# Patient Record
Sex: FEMALE | Race: WHITE | Employment: UNEMPLOYED | URBAN - METROPOLITAN AREA
[De-identification: names, ages, dates, MRNs, and addresses within clinical notes are randomized per-mention and may not be internally consistent; named-entity substitution may affect disease eponyms.]

---
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
## 2021-10-05 ENCOUNTER — OFFICE VISIT (OUTPATIENT)
Dept: INTERNAL MEDICINE CLINIC | Age: 21
End: 2021-10-05
Payer: COMMERCIAL

## 2021-10-05 VITALS
HEART RATE: 112 BPM | WEIGHT: 204.2 LBS | OXYGEN SATURATION: 100 % | SYSTOLIC BLOOD PRESSURE: 123 MMHG | BODY MASS INDEX: 32.05 KG/M2 | DIASTOLIC BLOOD PRESSURE: 74 MMHG | RESPIRATION RATE: 20 BRPM | HEIGHT: 67 IN | TEMPERATURE: 97.6 F

## 2021-10-05 DIAGNOSIS — Z76.89 ENCOUNTER TO ESTABLISH CARE: Primary | ICD-10-CM

## 2021-10-05 DIAGNOSIS — R00.0 TACHYCARDIA: ICD-10-CM

## 2021-10-05 DIAGNOSIS — G47.411 NARCOLEPSY WITH CATAPLEXY: ICD-10-CM

## 2021-10-05 DIAGNOSIS — F41.8 DEPRESSION WITH ANXIETY: ICD-10-CM

## 2021-10-05 PROCEDURE — 99204 OFFICE O/P NEW MOD 45 MIN: CPT | Performed by: NURSE PRACTITIONER

## 2021-10-05 RX ORDER — ESCITALOPRAM OXALATE 10 MG/1
10 TABLET ORAL DAILY
Qty: 90 TABLET | Refills: 1 | Status: SHIPPED | OUTPATIENT
Start: 2021-10-05 | End: 2021-12-14 | Stop reason: ALTCHOICE

## 2021-10-05 NOTE — PROGRESS NOTES
Chief Complaint   Patient presents with   [facility].

## 2021-10-05 NOTE — PROGRESS NOTES
[facility] [address], [address] [facility] Matt Deng  [phone] [facility]/[phone] fax    10/5/2021    HPI:   Danny Harmno 2000 is a pleasant WHITE/NON- female who presents today to establish care and for routine physical exam.  She was diagnosed with narcolepsy with cataplexy November 2020 and is being followed by , neuro. She is taking Xywav for treatment and tolerating well. She suffers from extreme anxiety that is crippling at times. She experiences elevated heart rate, palpitations, shortness of breath, and chest pain at times when her anxiety is elevated. She sees a counselor at [facility]. She was on Lexapro until it was discontinued in November 2020 when she was diagnosed with narcolepsy. She is unsure if Lexapro was effective due to her narcolepsy diagnosis. She is seeking to restart this therapy. She believes she suffering with situational depression as well. Her mother is currently taking Zoloft. Past Medical History:   Diagnosis Date    Acid reflux     Anxiety     Depression     Narcolepsy     Spina bifida occulta      History reviewed. No pertinent surgical history. Current Outpatient Medications   Medication Sig    escitalopram oxalate (LEXAPRO) 10 mg tablet Take 1 Tablet by mouth daily.  sodium,calcium,mag,pot oxybate (Xywav) 0.5 gram/mL soln Take  by mouth. No current facility-administered medications for this visit. Allergies and Intolerances:    Allergies   Allergen Reactions    Nuvigil [Armodafinil] Unknown (comments)     Involuntary muscle movements    Sunosi [Solriamfetol] Unable to Obtain     hyperactive     Family History:   Family History   Problem Relation Age of Onset    Attention Deficit Disorder Mother     Arthritis-osteo Mother     Migraines Father     Attention Deficit Disorder Sister     Breast Cancer Maternal Aunt     Depression Maternal Grandmother     Lung Disease Maternal Grandmother     High Cholesterol Maternal Grandmother     Heart Attack Maternal Grandmother     Asthma Maternal Grandmother     Eczema Maternal Grandmother     Lung Disease Maternal Grandfather     High Cholesterol Maternal Grandfather     Heart Attack Maternal Grandfather      Social History:   She  reports that she has never smoked. She has never used smokeless tobacco.   Social History     Substance and Sexual Activity   Alcohol Use Never     Immunization History:  Immunization History   Administered Date(s) Administered    COVID-19, PFIZER, MRNA, LNP-S, PF, 30MCG/0.3ML DOSE 04/25/2021, 05/16/2021    DTaP 2000, 01/28/2002, 03/27/2002, 05/14/2003, 09/29/2005    Hep A Vaccine 2 Dose Schedule (Ped/Adol) 12/28/2018    Hep B Vaccine [birth year] 07/05/2002    Hib (PRP-OMP) 2000, 01/28/2002, 03/27/2002, 10/30/2002    IPV 2000, 01/28/2002, 03/27/2002, 09/29/2005    Meningococcal (MCV4P) Vaccine 02/28/2014, 12/28/2018    Pneumococcal Conjugate (PCV-7) 2000, 01/28/2002, 03/27/2002, 10/30/2002    Td, Adsorbed PF 12/05/2011    Tdap 12/05/2011    Varicella Virus Vaccine 05/14/2003, 10/10/2008       Review of Systems:   As above included in HPI. Otherwise 11 point review of systems negative including constitutional, skin, HENT, eyes, respiratory, cardiovascular, gastrointestinal, genitourinary, musculoskeletal, endocrine, hematologic, allergy, and neurologic. Physical:   Visit Vitals  /74   Pulse (!) 112   Temp 97.6 °F (36.4 °C) (Temporal)   Resp 20   Ht 5' 7\" (1.702 m)   Wt 204 lb 3.2 oz (92.6 kg)   SpO2 100%   BMI 31.98 kg/m²      Wt Readings from Last 3 Encounters:   10/05/21 204 lb 3.2 oz (92.6 kg)         Exam:   Physical Exam  Vitals and nursing note reviewed. Constitutional:       Appearance: Normal appearance. She is obese. HENT:      Head: Normocephalic and atraumatic.       Right Ear: Tympanic membrane, ear canal and external ear normal. Left Ear: Tympanic membrane, ear canal and external ear normal.   Eyes:      Extraocular Movements: Extraocular movements intact. Conjunctiva/sclera: Conjunctivae normal.   Neck:      Vascular: No carotid bruit. Cardiovascular:      Rate and Rhythm: Normal rate and regular rhythm. Pulses: Normal pulses. Heart sounds: Normal heart sounds. Comments: No edema noted  Pulmonary:      Effort: Pulmonary effort is normal. No respiratory distress. Breath sounds: Normal breath sounds. No wheezing. Abdominal:      General: Abdomen is flat. Bowel sounds are normal. There is no distension. Palpations: Abdomen is soft. Tenderness: There is no abdominal tenderness. Musculoskeletal:         General: Normal range of motion. Cervical back: Normal range of motion and neck supple. Comments: Gait stable   Skin:     General: Skin is warm and dry. Neurological:      General: No focal deficit present. Mental Status: She is alert and oriented to person, place, and time. Psychiatric:         Mood and Affect: Mood normal.         Behavior: Behavior normal.         Thought Content: Thought content normal.         Judgment: Judgment normal.         Review of Data:  Labs reviewed: N/A      Plan:    ICD-10-CM ICD-9-CM    1. Encounter to establish care  Z76.89 V65.8    2. Depression with anxiety  F41.8 300.4 escitalopram oxalate (LEXAPRO) 10 mg tablet   3. Narcolepsy with cataplexy  G47.411 347.01    4. Tachycardia  R00.0 785.0      Encounter establish care  Patient's previous PCP was her pediatrician. Depression with anxiety  Initiate Lexapro 10 mg daily  After 2 weeks patient may increase Lexapro to 2 tablets (20 mg) daily and notify office if she chooses to do this.   Continue counseling    Narcolepsy with cataplexy  Continue to follow-up with neurologist Dr. Wilbert Guaman current therapy Levonia Perfect as prescribed by neuro    Tachycardia  Patient monitors her heart rate on her Elfego. Normally her heart rate is in the 80s and 90s but when her anxiety is up her heart rate can go as high as 150. Discussed obtaining EKG due to her intermittent palpitations and chest pain. She has requested to hold off on obtaining EKG for 6 months to see if Lexapro will be effective controlling her anxiety.     Follow up 6 months  Labs needed 1 week prior to appt: No    Dr. Sherice Castillo, AGNP-C, DNP  [facility] [address] [place] [address]

## 2021-11-01 ENCOUNTER — TELEPHONE (OUTPATIENT)
Dept: INTERNAL MEDICINE CLINIC | Age: 21
End: 2021-11-01

## 2021-11-01 NOTE — TELEPHONE ENCOUNTER
Pt request appointment for lump left side stomach as soon as possible. Stated it was noticed during a disability physical this past Saturday. Said she was previously unaware of it but after the doctor felt it she is now very aware and it is tender.        Sending to provider on call

## 2021-11-01 NOTE — TELEPHONE ENCOUNTER
Please schedule patient with DR. Ling as she has appointments available or wait until [facility] returns next week.

## 2021-11-02 ENCOUNTER — HOSPITAL ENCOUNTER (OUTPATIENT)
Dept: GENERAL RADIOLOGY | Age: 21
Discharge: HOME | End: 2021-11-02
Payer: COMMERCIAL

## 2021-11-02 ENCOUNTER — OFFICE VISIT (OUTPATIENT)
Dept: INTERNAL MEDICINE CLINIC | Age: 21
End: 2021-11-02
Payer: COMMERCIAL

## 2021-11-02 VITALS
OXYGEN SATURATION: 100 % | DIASTOLIC BLOOD PRESSURE: 73 MMHG | WEIGHT: 204.6 LBS | SYSTOLIC BLOOD PRESSURE: 111 MMHG | HEIGHT: 67 IN | RESPIRATION RATE: 20 BRPM | HEART RATE: 118 BPM | TEMPERATURE: 97.9 F | BODY MASS INDEX: 32.11 KG/M2

## 2021-11-02 DIAGNOSIS — R10.12 LEFT UPPER QUADRANT ABDOMINAL PAIN: Primary | ICD-10-CM

## 2021-11-02 DIAGNOSIS — R10.12 LEFT UPPER QUADRANT ABDOMINAL PAIN: ICD-10-CM

## 2021-11-02 LAB
BILIRUB UR QL STRIP: NEGATIVE
GLUCOSE UR-MCNC: NEGATIVE MG/DL
KETONES P FAST UR STRIP-MCNC: NEGATIVE MG/DL
PH UR STRIP: 7 [PH] (ref 4.6–8)
PROT UR QL STRIP: NEGATIVE
SP GR UR STRIP: 1.02 (ref 1–1.03)
UA UROBILINOGEN AMB POC: NORMAL (ref 0.2–1)
URINALYSIS CLARITY POC: CLEAR
URINALYSIS COLOR POC: YELLOW
URINE BLOOD POC: NEGATIVE
URINE LEUKOCYTES POC: NEGATIVE
URINE NITRITES POC: NEGATIVE

## 2021-11-02 PROCEDURE — 71100 X-RAY EXAM RIBS UNI 2 VIEWS: CPT

## 2021-11-02 PROCEDURE — 81003 URINALYSIS AUTO W/O SCOPE: CPT | Performed by: INTERNAL MEDICINE

## 2021-11-02 PROCEDURE — 99213 OFFICE O/P EST LOW 20 MIN: CPT | Performed by: INTERNAL MEDICINE

## 2021-11-02 NOTE — PROGRESS NOTES
Jose A Robertson female 22 y.o.       1. Have you been to the ER, urgent care clinic since your last visit? Hospitalized since your last visit? No    2. Have you seen or consulted any other health care providers outside of the [address] [place] [address] since your last visit? Include any pap smears or colon screening.  Yes When: Neurologist 10/21

## 2021-11-02 NOTE — PROGRESS NOTES
GAYLE Delatorre Comment is here for left upper quadrant abdominal pain, worse with certain movements. She says this began after a physician doing disability evaluations noted he found a \"lump\" in the left side of her abdomen. It was not bothering her before she was examined. She has not tried anything topical or oral for the pain. She denies any known trauma to the area. Past Medical History:   Diagnosis Date    Acid reflux     Anxiety     Depression     Narcolepsy     Spina bifida occulta         No past surgical history on file. Current Outpatient Medications   Medication Sig Dispense Refill    escitalopram oxalate (LEXAPRO) 10 mg tablet Take 1 Tablet by mouth daily. 90 Tablet 1    sodium,calcium,mag,pot oxybate (Xywav) 0.5 gram/mL soln Take  by mouth. Allergies   Allergen Reactions    Nuvigil [Armodafinil] Unknown (comments)     Involuntary muscle movements    Sunosi [Solriamfetol] Unable to Obtain     hyperactive        Social History     Socioeconomic History    Marital status: SINGLE     Spouse name: Not on file    Number of children: Not on file    Years of education: Not on file    Highest education level: Not on file   Occupational History    Not on file   Tobacco Use    Smoking status: Never Smoker    Smokeless tobacco: Never Used   Substance and Sexual Activity    Alcohol use: Never    Drug use: Never    Sexual activity: Not on file   Other Topics Concern    Not on file   Social History Narrative    Not on file     Social Determinants of Health     Financial Resource Strain:     Difficulty of Paying Living Expenses:    Food Insecurity:     Worried About Running Out of Food in the Last Year:     [address] in the Last Year:    Transportation Needs:     Lack of Transportation (Medical):      Lack of Transportation (Non-Medical):    Physical Activity:     Days of Exercise per Week:     Minutes of Exercise per Session:    Stress:     Feeling of Stress : Social Connections:     Frequency of Communication with Friends and Family:     Frequency of Social Gatherings with Friends and Family:     Attends Quaker Services:     Active Member of Clubs or Organizations:     Attends Club or Organization Meetings:     Marital Status:    Intimate Partner Violence:     Fear of Current or Ex-Partner:     Emotionally Abused:     Physically Abused:     Sexually Abused:         Family History   Problem Relation Age of Onset    Attention Deficit Disorder Mother     Arthritis-osteo Mother     Migraines Father     Attention Deficit Disorder Sister     Breast Cancer Maternal Aunt     Depression Maternal Grandmother     Lung Disease Maternal Grandmother     High Cholesterol Maternal Grandmother     Heart Attack Maternal Grandmother     Asthma Maternal Grandmother     Eczema Maternal Grandmother     Lung Disease Maternal Grandfather     High Cholesterol Maternal Grandfather     Heart Attack Maternal Grandfather            Visit Vitals  /73 (BP 1 Location: Left arm, BP Patient Position: Sitting, BP Cuff Size: Adult)   Pulse (!) 118   Temp 97.9 °F (36.6 °C) (Temporal)   Resp 20   Ht 5' 7\" (1.702 m)   Wt 204 lb 9.6 oz (92.8 kg)   LMP 10/08/2021   SpO2 100%   BMI 32.04 kg/m²        Review of Systems   Constitutional: Negative for chills and fever. Gastrointestinal: Negative for blood in stool. Genitourinary: Negative for dysuria, hematuria and urgency. Skin: Negative for rash. Psychiatric/Behavioral: The patient is not nervous/anxious. Physical Exam  Constitutional:       Appearance: She is not ill-appearing. Eyes:      General: No scleral icterus. Conjunctiva/sclera: Conjunctivae normal.   Neck:      Comments: Lymph: No cervical or supraclavicular lymphadenopathy. Cardiovascular:      Rate and Rhythm: Normal rate and regular rhythm. Pulses: Normal pulses. Heart sounds: No friction rub. No gallop.     Pulmonary:      Effort: Pulmonary effort is normal.      Breath sounds: Normal breath sounds. Abdominal:      General: Abdomen is flat. Palpations: Abdomen is soft. There is no mass. Comments: There is palpation over lower left ribs and mid axillary line, no bony instability. Musculoskeletal:      Cervical back: Neck supple. Comments: No clubbing, cyanosis, or edema. Calves nontender. Skin:     General: Skin is warm and dry. Findings: No rash. Neurological:      Mental Status: She is alert and oriented to person, place, and time. Psychiatric:         Mood and Affect: Mood normal.                  Diagnoses and all orders for this visit:    1. Left upper quadrant abdominal pain  Comments:  Possibly musculoskeletal.  Aleve 2 twice daily with food for the next few days.   Has order for left rib x-rays, further evaluation if pain persists  Orders:  -     AMB POC URINALYSIS DIP STICK AUTO W/O MICRO  -     XR RIBS LT UNI 2 V; Future                  Nick Marshall MD

## 2021-11-04 ENCOUNTER — TELEPHONE (OUTPATIENT)
Dept: INTERNAL MEDICINE CLINIC | Age: 21
End: 2021-11-04

## 2021-11-04 DIAGNOSIS — R10.12 LEFT UPPER QUADRANT ABDOMINAL PAIN: Primary | ICD-10-CM

## 2021-11-04 NOTE — TELEPHONE ENCOUNTER
Let her know I put in order for an ultrasound of her spleen, [facility] scheduling should be calling her to schedule this.

## 2021-11-04 NOTE — TELEPHONE ENCOUNTER
Patient reached and made aware of results, pt states she is still having some left side upper quadrant pain, states its still an achy pain 5/10.

## 2021-11-05 NOTE — TELEPHONE ENCOUNTER
Patient reached and made aware of US order per Dr. Nat Martinez. Patient states she believes it is her Amplified musculoskeletal pain syndrome (AMPS) which is causing her this pain and she rather not get ultrasound.

## 2021-11-11 DIAGNOSIS — R10.12 LEFT UPPER QUADRANT ABDOMINAL PAIN: ICD-10-CM

## 2021-11-23 ENCOUNTER — TELEPHONE (OUTPATIENT)
Dept: INTERNAL MEDICINE CLINIC | Age: 21
End: 2021-11-23

## 2021-11-23 NOTE — TELEPHONE ENCOUNTER
Reached patient and she stated she is wanting to see pain mgmt in regards to the narcolepsy with cataplexy that was discussed at her 10/5/21 appt. Patient states she wants to rule out whether or not is it AMPS and discuss treatment. Please place referral and I will call pt back once faxed.

## 2021-11-23 NOTE — TELEPHONE ENCOUNTER
Pt calling asking for referral to see Dr. Peri Randolph for pain management.  Their number is [phone]

## 2021-11-24 NOTE — TELEPHONE ENCOUNTER
Pain management is not utilized for diagnostic purposes. Pain management is just that pain management. The prescribed pain medication and other modalities to relieve 1 of pain. She needs to follow-up with her neurologist if she is seeking a possible diagnosis of AMPS.   Thank you

## 2021-11-24 NOTE — TELEPHONE ENCOUNTER
I am sorry I am unable to provide this referral as it is not and appropriate referral.  She has not been diagnosed with AMPS nor has she stated that she was having pain at her appointment. She needs to request a referral from her neurologist if they feel this is necessary.   Thank you

## 2021-11-24 NOTE — TELEPHONE ENCOUNTER
Patient reached and she states she just wants therapy for the pain. She states she is not trying to rule out AMPS she just wants to see pain management for the issue. Patient states her cousin deals with this issue and sees pain management.

## 2021-11-26 NOTE — TELEPHONE ENCOUNTER
Patient verbalized understanding and states she needs to see pain management. Offered pt VV to discuss issue with Dr. Mercedes New. Patient states she will find someone else that will \"listen\" to her.

## 2021-11-29 NOTE — TELEPHONE ENCOUNTER
Patient states she will wait until next available appt, which is 12/14 at 3:15. Patient wants to know if she can get routine blood work done at this visit as well because she hasn't had labs done in a while. Please advise.

## 2021-11-29 NOTE — TELEPHONE ENCOUNTER
Pt calling again re: referral. She was advised of the notes in this telephone encounter and she said they are not accurate. She now understands why you would say the referral needs to come from neurology. Stated it is not related to AMPS. Narcolepsy or cataplexy. Pari Ayo it is for extreme pain in hand and different other pain. After taking with her Aunt who has similar symptoms she would like to be referred to Rheumatology, not pain management. Does she need and appt to discuss this with you?

## 2021-12-14 ENCOUNTER — OFFICE VISIT (OUTPATIENT)
Dept: INTERNAL MEDICINE CLINIC | Age: 21
End: 2021-12-14
Payer: COMMERCIAL

## 2021-12-14 ENCOUNTER — HOSPITAL ENCOUNTER (OUTPATIENT)
Dept: LAB | Age: 21
Discharge: HOME | End: 2021-12-14

## 2021-12-14 VITALS
OXYGEN SATURATION: 100 % | DIASTOLIC BLOOD PRESSURE: 73 MMHG | WEIGHT: 207.6 LBS | RESPIRATION RATE: 20 BRPM | HEART RATE: 129 BPM | BODY MASS INDEX: 32.58 KG/M2 | TEMPERATURE: 97.7 F | HEIGHT: 67 IN | SYSTOLIC BLOOD PRESSURE: 123 MMHG

## 2021-12-14 DIAGNOSIS — G47.411 NARCOLEPSY WITH CATAPLEXY: ICD-10-CM

## 2021-12-14 DIAGNOSIS — R53.83 FATIGUE, UNSPECIFIED TYPE: ICD-10-CM

## 2021-12-14 DIAGNOSIS — E55.9 VITAMIN D DEFICIENCY: ICD-10-CM

## 2021-12-14 DIAGNOSIS — R20.2 NUMBNESS AND TINGLING IN BOTH HANDS: ICD-10-CM

## 2021-12-14 DIAGNOSIS — R52 GENERALIZED PAIN: ICD-10-CM

## 2021-12-14 DIAGNOSIS — R10.84 GENERALIZED ABDOMINAL PAIN: ICD-10-CM

## 2021-12-14 DIAGNOSIS — G98.8 SENSORY HYPERSENSITIVITY: ICD-10-CM

## 2021-12-14 DIAGNOSIS — E53.8 VITAMIN B12 DEFICIENCY: ICD-10-CM

## 2021-12-14 DIAGNOSIS — R20.0 NUMBNESS AND TINGLING IN BOTH HANDS: ICD-10-CM

## 2021-12-14 DIAGNOSIS — K21.9 GASTROESOPHAGEAL REFLUX DISEASE WITHOUT ESOPHAGITIS: Primary | ICD-10-CM

## 2021-12-14 DIAGNOSIS — F41.8 DEPRESSION WITH ANXIETY: ICD-10-CM

## 2021-12-14 DIAGNOSIS — Z87.820 HISTORY OF BRAIN CONCUSSION: ICD-10-CM

## 2021-12-14 LAB — XX-LABCORP SPECIMEN COL,LCBCF: NORMAL

## 2021-12-14 PROCEDURE — 99214 OFFICE O/P EST MOD 30 MIN: CPT | Performed by: NURSE PRACTITIONER

## 2021-12-14 PROCEDURE — 99001 SPECIMEN HANDLING PT-LAB: CPT

## 2021-12-14 RX ORDER — CITALOPRAM 20 MG/1
20 TABLET, FILM COATED ORAL DAILY
Qty: 90 TABLET | Refills: 0 | Status: SHIPPED | OUTPATIENT
Start: 2021-12-14 | End: 2022-03-06 | Stop reason: DRUGHIGH

## 2021-12-14 RX ORDER — OMEPRAZOLE 20 MG/1
20 CAPSULE, DELAYED RELEASE ORAL
Qty: 90 CAPSULE | Refills: 1 | Status: SHIPPED | OUTPATIENT
Start: 2021-12-14 | End: 2022-03-22

## 2021-12-14 NOTE — PROGRESS NOTES
Chief Complaint   Patient presents with    Hand Pain     bilateral hand pain    Pain (Chronic)         1. \"Have you been to the ER, urgent care clinic since your last visit? Hospitalized since your last visit? \" No    2. \"Have you seen or consulted any other health care providers outside of the [address] [place] [address] since your last visit? \" No     3. For patients aged 39-70: Has the patient had a colonoscopy / FIT/ Cologuard? NA based on age or sex     If the patient is female:    3. For patients aged 41-77: Has the patient had a mammogram within the past 2 years? NA based on age or sex    11. For patients aged 21-65: Has the patient had a pap smear?  NA based on age or sex

## 2021-12-17 LAB
25(OH)D3+25(OH)D2 SERPL-MCNC: 16.2 NG/ML (ref 30–100)
ALBUMIN SERPL-MCNC: 5.3 G/DL (ref 3.9–5)
ALBUMIN/GLOB SERPL: 1.6 {RATIO} (ref 1.2–2.2)
ALDOLASE SERPL-CCNC: 5.2 U/L (ref 3.3–10.3)
ALP SERPL-CCNC: 79 IU/L (ref 42–106)
ALT SERPL-CCNC: 11 IU/L (ref 0–32)
AST SERPL-CCNC: 16 IU/L (ref 0–40)
BASOPHILS # BLD AUTO: 0.1 X10E3/UL (ref 0–0.2)
BASOPHILS NFR BLD AUTO: 1 %
BILIRUB SERPL-MCNC: 0.3 MG/DL (ref 0–1.2)
BUN SERPL-MCNC: 9 MG/DL (ref 6–20)
BUN/CREAT SERPL: 12 (ref 9–23)
CALCIUM SERPL-MCNC: 10.4 MG/DL (ref 8.7–10.2)
CCP IGA+IGG SERPL IA-ACNC: 6 UNITS (ref 0–19)
CENTROMERE B AB SER-ACNC: <0.2 AI (ref 0–0.9)
CHLORIDE SERPL-SCNC: 103 MMOL/L (ref 96–106)
CHROMATIN AB SERPL-ACNC: <0.2 AI (ref 0–0.9)
CK SERPL-CCNC: 64 U/L (ref 32–182)
CO2 SERPL-SCNC: 19 MMOL/L (ref 20–29)
CREAT SERPL-MCNC: 0.75 MG/DL (ref 0.57–1)
CRP SERPL-MCNC: 2 MG/L (ref 0–10)
DSDNA AB SER-ACNC: <1 IU/ML (ref 0–9)
ENA JO1 AB SER-ACNC: <0.2 AI (ref 0–0.9)
ENA RNP AB SER-ACNC: 0.6 AI (ref 0–0.9)
ENA SCL70 AB SER-ACNC: <0.2 AI (ref 0–0.9)
ENA SM AB SER-ACNC: <0.2 AI (ref 0–0.9)
ENA SM+RNP AB SER-ACNC: <0.2 AI (ref 0–0.9)
ENA SS-A AB SER-ACNC: <0.2 AI (ref 0–0.9)
ENA SS-B AB SER-ACNC: <0.2 AI (ref 0–0.9)
EOSINOPHIL # BLD AUTO: 0.1 X10E3/UL (ref 0–0.4)
EOSINOPHIL NFR BLD AUTO: 1 %
ERYTHROCYTE [DISTWIDTH] IN BLOOD BY AUTOMATED COUNT: 12.6 % (ref 11.7–15.4)
ERYTHROCYTE [SEDIMENTATION RATE] IN BLOOD BY WESTERGREN METHOD: 30 MM/HR (ref 0–32)
FOLATE SERPL-MCNC: 15.1 NG/ML
GLOBULIN SER CALC-MCNC: 3.3 G/DL (ref 1.5–4.5)
GLUCOSE SERPL-MCNC: 67 MG/DL (ref 65–99)
HCT VFR BLD AUTO: 43.8 % (ref 34–46.6)
HGB BLD-MCNC: 14.1 G/DL (ref 11.1–15.9)
IMM GRANULOCYTES # BLD AUTO: 0 X10E3/UL (ref 0–0.1)
IMM GRANULOCYTES NFR BLD AUTO: 0 %
LDH SERPL-CCNC: 204 IU/L (ref 119–226)
LYMPHOCYTES # BLD AUTO: 3.1 X10E3/UL (ref 0.7–3.1)
LYMPHOCYTES NFR BLD AUTO: 35 %
Lab: ABNORMAL
MCH RBC QN AUTO: 27 PG (ref 26.6–33)
MCHC RBC AUTO-ENTMCNC: 32.2 G/DL (ref 31.5–35.7)
MCV RBC AUTO: 84 FL (ref 79–97)
METHYLMALONATE SERPL-SCNC: 463 NMOL/L (ref 0–378)
MITOCHONDRIA M2 IGG SER-ACNC: <20 UNITS (ref 0–20)
MONOCYTES # BLD AUTO: 0.6 X10E3/UL (ref 0.1–0.9)
MONOCYTES NFR BLD AUTO: 7 %
NEUTROPHILS # BLD AUTO: 5 X10E3/UL (ref 1.4–7)
NEUTROPHILS NFR BLD AUTO: 56 %
PLATELET # BLD AUTO: 287 X10E3/UL (ref 150–450)
POTASSIUM SERPL-SCNC: 4.3 MMOL/L (ref 3.5–5.2)
PROT SERPL-MCNC: 8.6 G/DL (ref 6–8.5)
RBC # BLD AUTO: 5.22 X10E6/UL (ref 3.77–5.28)
RHEUMATOID FACT SERPL-ACNC: 10.1 IU/ML (ref 0–15)
RIBOSOMAL P AB SER-ACNC: <0.2 AI (ref 0–0.9)
SEE BELOW:, 164879: NORMAL
SODIUM SERPL-SCNC: 142 MMOL/L (ref 134–144)
SPECIMEN STATUS REPORT, ROLRST: NORMAL
TSH SERPL DL<=0.005 MIU/L-ACNC: 1.4 UIU/ML (ref 0.45–4.5)
URATE SERPL-MCNC: 5.1 MG/DL (ref 2.6–6.2)
VIT B12 SERPL-MCNC: 517 PG/ML (ref 232–1245)
WBC # BLD AUTO: 8.9 X10E3/UL (ref 3.4–10.8)

## 2021-12-20 ENCOUNTER — TELEPHONE (OUTPATIENT)
Dept: INTERNAL MEDICINE CLINIC | Age: 21
End: 2021-12-20

## 2021-12-20 PROBLEM — E55.9 VITAMIN D DEFICIENCY: Status: ACTIVE | Noted: 2021-12-20

## 2021-12-20 PROBLEM — E53.8 VITAMIN B12 DEFICIENCY: Status: ACTIVE | Noted: 2021-12-20

## 2021-12-20 RX ORDER — CYANOCOBALAMIN 1000 UG/ML
INJECTION, SOLUTION INTRAMUSCULAR; SUBCUTANEOUS
Qty: 10 ML | Refills: 0 | Status: SHIPPED | OUTPATIENT
Start: 2021-12-20 | End: 2022-03-22

## 2021-12-20 RX ORDER — ERGOCALCIFEROL 1.25 MG/1
50000 CAPSULE ORAL
Qty: 12 CAPSULE | Refills: 3 | Status: SHIPPED | OUTPATIENT
Start: 2021-12-20
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
## 2021-12-20 NOTE — TELEPHONE ENCOUNTER
----- Message from Tom Johnson DNP sent at 12/20/2021  8:15 AM EST -----  Vit D and Vit B12 def. These are  most likely the cause for her tingling in her hands, weakness, fatigue, and achiness. I have escribed Vit D 29855 to her pharm. She is to take 1 cap every 7 days. I have also escribed vit B12 injections. Pt needs to go to the pharmacy to obtain this medication and schedule a nurse visit to obtain these injections 3w for 2w then weekly for 4w. Once this is completed, pt will take OTC Vit B12 daily. Remind her to bring the Vit B 12 serum with her.  Thank you

## 2021-12-20 NOTE — PROGRESS NOTES
Vit D and Vit B12 def. These are  most likely the cause for her tingling in her hands, weakness, fatigue, and achiness. I have escribed Vit D 81803 to her pharm. She is to take 1 cap every 7 days. I have also escribed vit B12 injections. Pt needs to go to the pharmacy to obtain this medication and schedule a nurse visit to obtain these injections 3w for 2w then weekly for 4w. Once this is completed, pt will take OTC Vit B12 daily. Remind her to bring the Vit B 12 serum with her.  Thank you

## 2021-12-21 NOTE — TELEPHONE ENCOUNTER
Pt aware of message below and verbalized understanding. Patient will call back to schedule the nurse visit for the B12 once she has obtained the RX from the pharmacy. No further questions or concerns from pt at this time.

## 2021-12-21 NOTE — TELEPHONE ENCOUNTER
Clarified with  since patient is symptomatic patient will need to start B12 injections with the following schedule.  3 injections per week for 2 weeks then weekly injections for 4 weeks

## 2021-12-22 ENCOUNTER — TELEPHONE (OUTPATIENT)
Dept: INTERNAL MEDICINE CLINIC | Age: 21
End: 2021-12-22

## 2021-12-22 NOTE — TELEPHONE ENCOUNTER
Patient states pharmacy indicates they can fill Rx because the  amount in unclear.   Please call patient

## 2022-01-04 ENCOUNTER — TELEPHONE (OUTPATIENT)
Dept: INTERNAL MEDICINE CLINIC | Age: 22
End: 2022-01-04

## 2022-01-05 ENCOUNTER — CLINICAL SUPPORT (OUTPATIENT)
Dept: INTERNAL MEDICINE CLINIC | Age: 22
End: 2022-01-05
Payer: COMMERCIAL

## 2022-01-05 DIAGNOSIS — E53.8 VITAMIN B12 DEFICIENCY: Primary | ICD-10-CM

## 2022-01-05 PROCEDURE — 96372 THER/PROPH/DIAG INJ SC/IM: CPT | Performed by: NURSE PRACTITIONER

## 2022-01-05 RX ORDER — CYANOCOBALAMIN 1000 UG/ML
1000 INJECTION, SOLUTION INTRAMUSCULAR; SUBCUTANEOUS
Status: DISCONTINUED | OUTPATIENT
Start: 2022-01-05 | End: 2022-03-22

## 2022-01-05 RX ADMIN — CYANOCOBALAMIN 1000 MCG: 1000 INJECTION, SOLUTION INTRAMUSCULAR; SUBCUTANEOUS at 14:16

## 2022-01-13 ENCOUNTER — CLINICAL SUPPORT (OUTPATIENT)
Dept: INTERNAL MEDICINE CLINIC | Age: 22
End: 2022-01-13
Payer: COMMERCIAL

## 2022-01-13 VITALS
WEIGHT: 207 LBS | HEART RATE: 99 BPM | HEIGHT: 67 IN | BODY MASS INDEX: 32.49 KG/M2 | RESPIRATION RATE: 16 BRPM | TEMPERATURE: 97.1 F | DIASTOLIC BLOOD PRESSURE: 85 MMHG | SYSTOLIC BLOOD PRESSURE: 129 MMHG

## 2022-01-13 DIAGNOSIS — E53.8 VITAMIN B 12 DEFICIENCY: Primary | ICD-10-CM

## 2022-01-13 PROCEDURE — 96372 THER/PROPH/DIAG INJ SC/IM: CPT | Performed by: NURSE PRACTITIONER

## 2022-01-13 RX ADMIN — CYANOCOBALAMIN 1000 MCG: 1000 INJECTION, SOLUTION INTRAMUSCULAR; SUBCUTANEOUS at 14:45

## 2022-01-13 NOTE — PROGRESS NOTES
Verbal order read back per Dr. Schuyler Arellano Vitamin b12 injection. Patient received vitamin b 12 injection in right deltoid. Patient was observed and no signs or symptoms of an allergic reaction noted at this time. Patient tolerated well and left without complaints. Patient supplied medication.

## 2022-01-27 ENCOUNTER — ANESTHESIA EVENT (OUTPATIENT)
Dept: ENDOSCOPY | Age: 22
End: 2022-01-27
Payer: COMMERCIAL

## 2022-01-27 ENCOUNTER — CLINICAL SUPPORT (OUTPATIENT)
Dept: INTERNAL MEDICINE CLINIC | Age: 22
End: 2022-01-27
Payer: COMMERCIAL

## 2022-01-27 DIAGNOSIS — E53.8 B12 DEFICIENCY: Primary | ICD-10-CM

## 2022-01-27 PROCEDURE — 96372 THER/PROPH/DIAG INJ SC/IM: CPT | Performed by: NURSE PRACTITIONER

## 2022-01-27 RX ADMIN — CYANOCOBALAMIN 1000 MCG: 1000 INJECTION, SOLUTION INTRAMUSCULAR; SUBCUTANEOUS at 15:18

## 2022-01-27 NOTE — PROGRESS NOTES
Pedro Amaro presents with   Chief Complaint   Patient presents with    Injection B12      Patient given B12 injection IM in R deltoid, patient tolerated this procedure without incident.

## 2022-01-28 ENCOUNTER — ANESTHESIA (OUTPATIENT)
Dept: ENDOSCOPY | Age: 22
End: 2022-01-28
Payer: COMMERCIAL

## 2022-01-28 ENCOUNTER — HOSPITAL ENCOUNTER (OUTPATIENT)
Age: 22
Setting detail: OUTPATIENT SURGERY
Discharge: HOME | End: 2022-01-28
Attending: INTERNAL MEDICINE | Admitting: INTERNAL MEDICINE
Payer: COMMERCIAL

## 2022-01-28 VITALS
OXYGEN SATURATION: 100 % | HEART RATE: 63 BPM | TEMPERATURE: 98.9 F | HEIGHT: 67 IN | DIASTOLIC BLOOD PRESSURE: 60 MMHG | BODY MASS INDEX: 32.18 KG/M2 | RESPIRATION RATE: 19 BRPM | WEIGHT: 205 LBS | SYSTOLIC BLOOD PRESSURE: 101 MMHG

## 2022-01-28 LAB — HCG UR QL: NEGATIVE

## 2022-01-28 PROCEDURE — 74011250636 HC RX REV CODE- 250/636: Performed by: NURSE ANESTHETIST, CERTIFIED REGISTERED

## 2022-01-28 PROCEDURE — 2709999900 HC NON-CHARGEABLE SUPPLY: Performed by: INTERNAL MEDICINE

## 2022-01-28 PROCEDURE — 77030008565 HC TBNG SUC IRR ERBE -B: Performed by: INTERNAL MEDICINE

## 2022-01-28 PROCEDURE — 76040000019: Performed by: INTERNAL MEDICINE

## 2022-01-28 PROCEDURE — 00731 ANES UPR GI NDSC PX NOS: CPT | Performed by: NURSE ANESTHETIST, CERTIFIED REGISTERED

## 2022-01-28 PROCEDURE — 88305 TISSUE EXAM BY PATHOLOGIST: CPT

## 2022-01-28 PROCEDURE — 81025 URINE PREGNANCY TEST: CPT

## 2022-01-28 PROCEDURE — 74011000250 HC RX REV CODE- 250: Performed by: NURSE ANESTHETIST, CERTIFIED REGISTERED

## 2022-01-28 PROCEDURE — 76060000031 HC ANESTHESIA FIRST 0.5 HR: Performed by: INTERNAL MEDICINE

## 2022-01-28 PROCEDURE — 00731 ANES UPR GI NDSC PX NOS: CPT | Performed by: ANESTHESIOLOGY

## 2022-01-28 PROCEDURE — 77030019988 HC FCPS ENDOSC DISP BSC -B: Performed by: INTERNAL MEDICINE

## 2022-01-28 RX ORDER — SODIUM CHLORIDE, SODIUM LACTATE, POTASSIUM CHLORIDE, CALCIUM CHLORIDE 600; 310; 30; 20 MG/100ML; MG/100ML; MG/100ML; MG/100ML
25 INJECTION, SOLUTION INTRAVENOUS CONTINUOUS
Status: DISCONTINUED | OUTPATIENT
Start: 2022-01-28 | End: 2022-01-28 | Stop reason: HOSPADM

## 2022-01-28 RX ORDER — PROPOFOL 10 MG/ML
INJECTION, EMULSION INTRAVENOUS AS NEEDED
Status: DISCONTINUED | OUTPATIENT
Start: 2022-01-28 | End: 2022-01-28 | Stop reason: HOSPADM

## 2022-01-28 RX ADMIN — PROPOFOL 20 MG: 10 INJECTION, EMULSION INTRAVENOUS at 11:56

## 2022-01-28 RX ADMIN — FAMOTIDINE: 10 INJECTION INTRAVENOUS at 10:27

## 2022-01-28 RX ADMIN — PROPOFOL 50 MG: 10 INJECTION, EMULSION INTRAVENOUS at 11:55

## 2022-01-28 RX ADMIN — PROPOFOL 50 MG: 10 INJECTION, EMULSION INTRAVENOUS at 11:52

## 2022-01-28 RX ADMIN — SODIUM CHLORIDE, SODIUM LACTATE, POTASSIUM CHLORIDE, AND CALCIUM CHLORIDE 25 ML/HR: 600; 310; 30; 20 INJECTION, SOLUTION INTRAVENOUS at 10:27

## 2022-01-28 RX ADMIN — PROPOFOL 50 MG: 10 INJECTION, EMULSION INTRAVENOUS at 11:53

## 2022-01-28 RX ADMIN — PROPOFOL 50 MG: 10 INJECTION, EMULSION INTRAVENOUS at 11:54

## 2022-01-28 RX ADMIN — PROPOFOL 30 MG: 10 INJECTION, EMULSION INTRAVENOUS at 11:57

## 2022-01-28 NOTE — ANESTHESIA PREPROCEDURE EVALUATION
Anesthetic History   No history of anesthetic complications            Review of Systems / Medical History  Patient summary reviewed, nursing notes reviewed and pertinent labs reviewed    Pulmonary  Within defined limits                 Neuro/Psych             Comments: syncope Cardiovascular            Dysrhythmias            GI/Hepatic/Renal     GERD           Endo/Other        Obesity     Other Findings            Physical Exam    Airway  Mallampati: II  TM Distance: 4 - 6 cm  Neck ROM: normal range of motion   Mouth opening: Normal     Cardiovascular    Rhythm: regular           Dental    Dentition: Upper dentition intact and Lower dentition intact     Pulmonary  Breath sounds clear to auscultation               Abdominal  GI exam deferred       Other Findings            Anesthetic Plan    ASA: 2  Anesthesia type: MAC            Anesthetic plan and risks discussed with: Patient

## 2022-01-28 NOTE — DISCHARGE INSTRUCTIONS
.Upper GI Endoscopy: What to Expect at [address] [place] [address]  After you have an endoscopy, you will stay at the hospital or clinic for 1 to 2 hours. This will allow the medicine to wear off. You will be able to go home after your doctor or nurse checks to make sure you are not having any problems. You may have to stay overnight if you had treatment during the test. You may have a sore throat for a day or two after the test.  This care sheet gives you a general idea about what to expect after the test.  How can you care for yourself at home? Activity  · Rest as much as you need to after you go home. · You should be able to go back to your usual activities the day after the test.  Diet  · Follow your doctor's directions for eating after the test.  · Drink plenty of fluids (unless your doctor has told you not to). Medications  · If you have a sore throat the day after the test, use an over-the-counter spray to numb your throat. Follow-up care is a key part of your treatment and safety. Be sure to make and go to all appointments, and call your doctor if you are having problems. It's also a good idea to know your test results and keep a list of the medicines you take. When should you call for help? Call 911 anytime you think you may need emergency care. For example, call if:  · You passed out (lost consciousness). · You cough up blood. · You vomit blood or what looks like coffee grounds. · You pass maroon or very bloody stools. Call your doctor now or seek immediate medical care if:  · You have trouble swallowing. · You have belly pain. · Your stools are black and tarlike or have streaks of blood. · You are sick to your stomach or cannot keep fluids down. Watch closely for changes in your health, and be sure to contact your doctor if:  · Your throat still hurts after a day or two. · You do not get better as expected. Where can you learn more?    Go to [employer]  Enter J454 in the search box to learn more about \"Upper GI Endoscopy: What to Expect at Home. \"   © 9071-5081 Healthwise, Incorporated. Care instructions adapted under license by [facility] [employer] (which disclaims liability or warranty for this information). This care instruction is for use with your licensed healthcare professional. If you have questions about a medical condition or this instruction, always ask your healthcare professional. [address] any warranty or liability for your use of this information. Content Version: 84.5.713308; Current as of: November 14, 2014    DISCHARGE SUMMARY from Nurse     POST-PROCEDURE INSTRUCTIONS:    Call your Physician if you:  ? Observe any excess bleeding. ? Develop a temperature over 100.5o F.  ? Experience abdominal, shoulder or chest pain. ? Notice any signs of decreased circulation or nerve impairment to an extremity such as a change in color, persistent numbness, tingling, coldness or increase in pain. ? Vomit blood or you have nausea and vomiting lasting longer than 4 hours. ? Are unable to take medications. ? Are unable to urinate within 8 hours after discharge following general anesthesia or intravenous sedation. For the next 24 hours after receiving general anesthesia or intravenous sedation, or while taking prescription Narcotics, limit your activities:  ? Do NOT drive a motor vehicle, operate hazard machinery or power tools, or perform tasks that require coordination. The medication you received during your procedure may have some effect on your mental awareness. ? Do NOT make important personal or business decisions. The medication you received during your procedure may have some effect on your mental awareness. ? Do NOT drink alcoholic beverages. These drinks do not mix well with the medications that have been given to you. ? Upon discharge from the hospital, you must be accompanied by a responsible adult. ?  Resume your diet as directed by your physician. ? Resume medications as your physician has prescribed. ? Please give a list of your current medications to your Primary Care Provider. ? Please update this list whenever your medications are discontinued, doses are changed, or new medications (including over-the-counter products) are added. ? Please carry medication information at all times in case of emergency situations. These are general instructions for a healthy lifestyle:    No smoking/ No tobacco products/ Avoid exposure to second hand smoke.  Surgeon General's Warning:  Quitting smoking now greatly reduces serious risk to your health. Obesity, smoking, and a sedentary lifestyle greatly increase your risk for illness.  A healthy diet, regular physical exercise & weight monitoring are important for maintaining a healthy lifestyle   You may be retaining fluid if you have a history of heart failure or if you experience any of the following symptoms:  Weight gain of 3 pounds or more overnight or 5 pounds in a week, increased swelling in our hands or feet or shortness of breath while lying flat in bed. Please call your doctor as soon as you notice any of these symptoms; do not wait until your next office visit. Colorectal Screening   Colorectal cancer almost always develops from precancerous polyps (abnormal growths) in the colon or rectum. Screening tests can find precancerous polyps, so that they can be removed before they turn into cancer. Screening tests can also find colorectal cancer early, when treatment works best.  [facility] Speak with your physician about when you should begin screening and how often you should be tested  [facility]   Additional Information    Educational references and/or instructions provided during this visit included:    See attached. APPOINTMENTS:    Per MD Instruction. Discharge information has been reviewed with the patient. The patient verbalized understanding.     Patient Education        Esophageal Dilation: What to Expect at [address] had an esophageal dilation. This procedure can open up narrow areas of the esophagus. After the procedure, you will stay at the hospital or surgery center for 1 to 2 hours. This will allow the medicine to wear off. You will be able to go home after your doctor or nurse checks to make sure that you're not having any problems. This care sheet gives you a general idea about how long it will take for you to recover. But each person recovers at a different pace. Follow the steps below to get better as quickly as possible. How can you care for yourself at home? Activity    · Rest as much as you need to after you go home.     · You should be able to go back to your usual activities the day after the procedure. Diet    · Follow your doctor's directions for eating after the procedure.     · Drink plenty of fluids (unless your doctor has told you not to). Medicines    · Your doctor will tell you if and when you can restart your medicines. He or she will also give you instructions about taking any new medicines.     · If you take aspirin or some other blood thinner, ask your doctor if and when to start taking it again. Make sure that you understand exactly what your doctor wants you to do.     · If you have a sore throat the day after the procedure, use an over-the-counter spray to numb your throat. Sucking on throat lozenges and gargling with warm salt water may also help relieve your symptoms. Follow-up care is a key part of your treatment and safety. Be sure to make and go to all appointments, and call your doctor if you are having problems. It's also a good idea to know your test results and keep a list of the medicines you take. When should you call for help? Call 911 anytime you think you may need emergency care.  For example, call if:    · You passed out (lost consciousness).     · You have trouble breathing.     · Your stools are maroon or very bloody Call your doctor now or seek immediate medical care if:    · You have new or worse belly pain.     · You have a fever.     · You have new or more blood in your stools.     · You are sick to your stomach and cannot drink fluids.     · You cannot pass stools or gas.     · You have pain that does not get better after you take pain medicine. Watch closely for changes in your health, and be sure to contact your doctor if:    · Your throat still hurts after a day or two.     · You do not get better as expected. Where can you learn more? Go to http://www.gray.com/  Enter J014 in the search box to learn more about \"Esophageal Dilation: What to Expect at Home. \"  Current as of: February 10, 2021               Content Version: 13.0  © 2006-2021 Healthwise, Incorporated. Care instructions adapted under license by [employer] (which disclaims liability or warranty for this information). If you have questions about a medical condition or this instruction, always ask your healthcare professional. [address] any warranty or liability for your use of this information.

## 2022-01-28 NOTE — ANESTHESIA POSTPROCEDURE EVALUATION
Procedure(s):  UPPER ENDOSCOPY / dilation w bx's. MAC    Anesthesia Post Evaluation      Multimodal analgesia: multimodal analgesia used between 6 hours prior to anesthesia start to PACU discharge  Patient location during evaluation: bedside  Patient participation: complete - patient participated  Level of consciousness: awake  Pain management: adequate  Airway patency: patent  Anesthetic complications: no  Cardiovascular status: stable  Respiratory status: acceptable  Hydration status: acceptable  Post anesthesia nausea and vomiting:  controlled      INITIAL Post-op Vital signs:   Vitals Value Taken Time   /69 01/28/22 1225   Temp 36.7 °C (98 °F) 01/28/22 1205   Pulse 79 01/28/22 1234   Resp 15 01/28/22 1234   SpO2 100 % 01/28/22 1234   Vitals shown include unvalidated device data.

## 2022-01-29 DIAGNOSIS — E53.8 VITAMIN B12 DEFICIENCY: ICD-10-CM

## 2022-02-03 RX ORDER — CYANOCOBALAMIN 1000 UG/ML
INJECTION, SOLUTION INTRAMUSCULAR; SUBCUTANEOUS
Qty: 8 ML | Refills: 1 | OUTPATIENT
Start: 2022-02-03

## 2022-02-04 NOTE — TELEPHONE ENCOUNTER
Please reach out to patient's pharmacy and inquire about vitamin B12 prescription. They have requested a refill. Orders placed on 12/14/2021 stated injections 3 times a week for 2 weeks then weekly x4 weeks which means patient should have completed her therapy approximately 1/28/2022. Also, review nursing visits to confirm she has rec'd 10 vitamin B12 injections.   Thank you Patient is a 87y old  Male who presents with a chief complaint of fall (04 Dec 2019 11:14)      INTERVAL /OVERNIGHT EVENTS: feels ok    MEDICATIONS  (STANDING):  amantadine 100 milliGRAM(s) Oral two times a day  ATENolol  Tablet 50 milliGRAM(s) Oral daily  calcium carbonate 1250 mG  + Vitamin D (OsCal 500 + D) 1 Tablet(s) Oral daily  carbidopa/levodopa CR 25/100 1.5 Tablet(s) Oral <User Schedule>  donepezil 5 milliGRAM(s) Oral at bedtime  furosemide    Tablet 20 milliGRAM(s) Oral daily  lisinopril 20 milliGRAM(s) Oral daily  tamsulosin 0.4 milliGRAM(s) Oral at bedtime  warfarin 4 milliGRAM(s) Oral daily    MEDICATIONS  (PRN):      Allergies    sulfa drugs (Unknown)    Intolerances        REVIEW OF SYSTEMS:  CONSTITUTIONAL: No fever, weight loss, or fatigue  EYES: No eye pain, visual disturbances, or discharge  ENMT:  No difficulty hearing, tinnitus, vertigo; No sinus or throat pain  NECK: No pain or stiffness  RESPIRATORY: No cough, wheezing, chills or hemoptysis; No shortness of breath  CARDIOVASCULAR: No chest pain, palpitations, dizziness, or leg swelling  GASTROINTESTINAL: No abdominal or epigastric pain. No nausea, vomiting, or hematemesis; No diarrhea or constipation. No melena or hematochezia.  GENITOURINARY: No dysuria, frequency, hematuria, or incontinence  NEUROLOGICAL: No headaches, memory loss, loss of strength, numbness, or tremors  SKIN: No itching, burning, rashes, or lesions   LYMPH NODES: No enlarged glands  ENDOCRINE: No heat or cold intolerance; No hair loss; No polydipsia or polyuria  MUSCULOSKELETAL: No joint pain or swelling; No muscle, back, or extremity pain  PSYCHIATRIC: No depression, anxiety, mood swings, or difficulty sleeping  HEME/LYMPH: No easy bruising, or bleeding gums  ALLERGY AND IMMUNOLOGIC: No hives or eczema    Vital Signs Last 24 Hrs  T(C): 36.9 (04 Dec 2019 15:58), Max: 36.9 (04 Dec 2019 15:58)  T(F): 98.5 (04 Dec 2019 15:58), Max: 98.5 (04 Dec 2019 15:58)  HR: 64 (04 Dec 2019 15:58) (56 - 66)  BP: 133/74 (04 Dec 2019 15:58) (131/85 - 153/77)  BP(mean): --  RR: 22 (04 Dec 2019 15:58) (17 - 22)  SpO2: 97% (04 Dec 2019 15:58) (96% - 98%)    PHYSICAL EXAM:  GENERAL: NAD, well-groomed, well-developed  HEAD:  Atraumatic, Normocephalic  EYES: EOMI, PERRLA, conjunctiva and sclera clear  ENMT: No tonsillar erythema, exudates, or enlargement; Moist mucous membranes, Good dentition, No lesions  NECK: Supple, No JVD, Normal thyroid  NERVOUS SYSTEM:  Alert & Oriented X3, Good concentration; Motor Strength 5/5 B/L upper and lower extremities; DTRs 2+ intact and symmetric  CHEST/LUNG: Clear to auscultation bilaterally; No rales, rhonchi, wheezing, or rubs  HEART: Regular rate and rhythm; No murmurs, rubs, or gallops  ABDOMEN: Soft, Nontender, Nondistended; Bowel sounds present  EXTREMITIES:  2+ Peripheral Pulses, No clubbing, cyanosis, or edema  LYMPH: No lymphadenopathy noted  SKIN: No rashes or lesions    LABS:          PT/INR - ( 04 Dec 2019 12:07 )   PT: 23.4 sec;   INR: 2.03 ratio         PTT - ( 03 Dec 2019 07:02 )  PTT:37.1 sec    CAPILLARY BLOOD GLUCOSE          RADIOLOGY & ADDITIONAL TESTS:    Notes Reviewed:  [x ] YES  [ ] NO    Care Discussed with Consultants/Other Providers [ x] YES  [ ] NO

## 2022-02-08 NOTE — TELEPHONE ENCOUNTER
Called pt's pharmacy and confirmed patient hs refills left. Spoke with pt and she confirmed she has enough for 1 injection each month.

## 2022-02-14 ENCOUNTER — TELEPHONE (OUTPATIENT)
Dept: INTERNAL MEDICINE CLINIC | Age: 22
End: 2022-02-14

## 2022-02-14 NOTE — TELEPHONE ENCOUNTER
----- Message from Andrei Resendiz sent at 2/14/2022  2:33 PM EST -----  Subject: Message to Provider    QUESTIONS  Information for Provider? Pt called in regards to a bill for her B-12   shots. She states that she purchases them from the pharmacy and was never   told that she would be charged for getting them at the practice. Pt states   that she was waiting on hold with the billing department for an excessive   amount of time and would like a call back explaining the charges. Please   advise. If there is no answer at [phone] please call [phone]  ---------------------------------------------------------------------------  --------------  CALL BACK INFO  What is the best way for the office to contact you? OK to leave message on   voicemail  Preferred Call Back Phone Number? [phone]  ---------------------------------------------------------------------------  --------------  SCRIPT ANSWERS  Relationship to Patient?  Self

## 2022-02-14 NOTE — TELEPHONE ENCOUNTER
There is an administration fee for giving the shot, she is not charge for the B12 medication that she gets from the pharmacy.

## 2022-02-21 ENCOUNTER — TELEPHONE (OUTPATIENT)
Dept: INTERNAL MEDICINE CLINIC | Age: 22
End: 2022-02-21

## 2022-02-21 NOTE — TELEPHONE ENCOUNTER
Patient called stating she was not aware she would have to pay for nurse visits for b12 and she wont be getting them any more. Patient states she has been taking OTC b12 and vitamin D daily. Wants to know if there is anything else you would recommend.  Patient also rescheduled for 6 month follow up to a sooner date (3/22) to discuss her GI issues sinceshe has already seen GI and everything was normal.

## 2022-03-03 DIAGNOSIS — F41.8 ANXIETY WITH DEPRESSION: Primary | ICD-10-CM

## 2022-03-03 RX ORDER — CITALOPRAM 40 MG/1
40 TABLET, FILM COATED ORAL DAILY
Qty: 90 TABLET | Refills: 1 | Status: SHIPPED | OUTPATIENT
Start: 2022-03-03 | End: 2022-08-23

## 2022-03-03 RX ORDER — CITALOPRAM 20 MG/1
20 TABLET, FILM COATED ORAL DAILY
Qty: 90 TABLET | Refills: 0 | Status: CANCELLED | OUTPATIENT
Start: 2022-03-03

## 2022-03-03 NOTE — TELEPHONE ENCOUNTER
Eloise Marrero [facility]  Phone Number: [phone]     Refills have been requested for the following medications:         citalopram (CELEXA) 20 mg tablet Mariela Deleon, BEN]       Patient Comment: I would like to try a higher amount because this amount isnt doing much to help.      Preferred pharmacy: [id number] - [place]

## 2022-03-03 NOTE — TELEPHONE ENCOUNTER
I have increased her Celexa from 20 mg to 40 mg. She may take Celexa 20 mg 2 tablets till all gone and then start the 40 mg. Please remind her to only take the omeprazole as needed due to possible conflict between omeprazole and Celexa. Remind patient she does have an appointment with me on 3/22/2022.   Thank you

## 2022-03-06 RX ORDER — CITALOPRAM 20 MG/1
TABLET, FILM COATED ORAL
Qty: 30 TABLET | Refills: 2 | OUTPATIENT
Start: 2022-03-06

## 2022-03-06 NOTE — TELEPHONE ENCOUNTER
Requested Prescriptions     Refused Prescriptions Disp Refills    citalopram (CELEXA) 20 mg tablet [Pharmacy Med Name: CITALOPRAM HBR 20 MG TABLET] 30 Tablet 2     Sig: TAKE 1 TABLET BY MOUTH EVERY DAY     Refused By: Adriel Bran     Reason for Refusal: Refill not appropriate     3/3/22 RX 90 tabs with 1 refill

## 2022-03-07 DIAGNOSIS — K21.9 GASTROESOPHAGEAL REFLUX DISEASE WITHOUT ESOPHAGITIS: Primary | ICD-10-CM

## 2022-03-07 RX ORDER — PANTOPRAZOLE SODIUM 20 MG/1
20 TABLET, DELAYED RELEASE ORAL DAILY
Qty: 90 TABLET | Refills: 1 | Status: SHIPPED | OUTPATIENT
Start: 2022-03-07 | End: 2022-08-23

## 2022-03-08 NOTE — TELEPHONE ENCOUNTER
Patient reached and made aware of message and rx sent over to pharmacy per Dr. Radha Rodriguez. Patient states she is in [place] taking care of her isaiah and she needs the Pantoprazole and Celexa sent to [facility], called pharmacy and requested they transfer both meds over to temporary [facility] for patient. Pt made aware.

## 2022-03-08 NOTE — PROGRESS NOTES
ICD-10-CM ICD-9-CM    1. Gastroesophageal reflux disease without esophagitis  K21.9 530.81 pantoprazole (PROTONIX) 20 mg tablet     DC omeprazole and initiate pantoprazole d/t possible interaction between omeprazole and celexa.

## 2022-03-18 PROBLEM — F41.8 ANXIETY WITH DEPRESSION: Status: ACTIVE | Noted: 2022-03-03

## 2022-03-19 PROBLEM — E55.9 VITAMIN D DEFICIENCY: Status: ACTIVE | Noted: 2021-12-20

## 2022-03-19 PROBLEM — K21.9 GASTROESOPHAGEAL REFLUX DISEASE WITHOUT ESOPHAGITIS: Status: ACTIVE | Noted: 2022-03-07

## 2022-03-19 PROBLEM — E53.8 VITAMIN B12 DEFICIENCY: Status: ACTIVE | Noted: 2021-12-20

## 2022-03-22 ENCOUNTER — APPOINTMENT (OUTPATIENT)
Dept: INTERNAL MEDICINE CLINIC | Age: 22
End: 2022-03-22

## 2022-03-22 ENCOUNTER — HOSPITAL ENCOUNTER (OUTPATIENT)
Dept: LAB | Age: 22
Discharge: HOME | End: 2022-03-22
Payer: COMMERCIAL

## 2022-03-22 ENCOUNTER — OFFICE VISIT (OUTPATIENT)
Dept: INTERNAL MEDICINE CLINIC | Age: 22
End: 2022-03-22
Payer: COMMERCIAL

## 2022-03-22 VITALS
BODY MASS INDEX: 33.06 KG/M2 | HEART RATE: 111 BPM | DIASTOLIC BLOOD PRESSURE: 70 MMHG | TEMPERATURE: 97.9 F | WEIGHT: 210.6 LBS | HEIGHT: 67 IN | RESPIRATION RATE: 18 BRPM | OXYGEN SATURATION: 99 % | SYSTOLIC BLOOD PRESSURE: 114 MMHG

## 2022-03-22 DIAGNOSIS — M62.81 MUSCLE WEAKNESS: ICD-10-CM

## 2022-03-22 DIAGNOSIS — F41.8 ANXIETY WITH DEPRESSION: ICD-10-CM

## 2022-03-22 DIAGNOSIS — R56.9 SEIZURE-LIKE ACTIVITY (HCC): ICD-10-CM

## 2022-03-22 DIAGNOSIS — K59.00 CONSTIPATION, UNSPECIFIED CONSTIPATION TYPE: ICD-10-CM

## 2022-03-22 DIAGNOSIS — R29.898: Primary | ICD-10-CM

## 2022-03-22 DIAGNOSIS — R55 SYNCOPE, UNSPECIFIED SYNCOPE TYPE: ICD-10-CM

## 2022-03-22 DIAGNOSIS — R53.83 FATIGUE, UNSPECIFIED TYPE: ICD-10-CM

## 2022-03-22 DIAGNOSIS — R00.0 TACHYCARDIA: ICD-10-CM

## 2022-03-22 DIAGNOSIS — R11.0 NAUSEA: ICD-10-CM

## 2022-03-22 DIAGNOSIS — Z87.820 HISTORY OF BRAIN CONCUSSION: ICD-10-CM

## 2022-03-22 DIAGNOSIS — R52 GENERALIZED BODY ACHES: ICD-10-CM

## 2022-03-22 DIAGNOSIS — G47.411 NARCOLEPSY WITH CATAPLEXY: ICD-10-CM

## 2022-03-22 PROCEDURE — 82657 ENZYME CELL ACTIVITY: CPT

## 2022-03-22 PROCEDURE — 84120 ASSAY OF URINE PORPHYRINS: CPT

## 2022-03-22 PROCEDURE — 99214 OFFICE O/P EST MOD 30 MIN: CPT | Performed by: NURSE PRACTITIONER

## 2022-03-22 PROCEDURE — 82135 ASSAY AMINOLEVULINIC ACID: CPT

## 2022-03-22 PROCEDURE — 36415 COLL VENOUS BLD VENIPUNCTURE: CPT

## 2022-03-22 NOTE — PROGRESS NOTES
Chief Complaint   Patient presents with    Follow-up     6 months     1. \"Have you been to the ER, urgent care clinic since your last visit? Hospitalized since your last visit? \" No    2. \"Have you seen or consulted any other health care providers outside of the [address] [place] [address] since your last visit? \" Yes, chiropractor and GI.    3. For patients aged 39-70: Has the patient had a colonoscopy / FIT/ Cologuard? NA - based on age      If the patient is female:    4. For patients aged 41-77: Has the patient had a mammogram within the past 2 years? NA - based on age or sex      11. For patients aged 21-65: Has the patient had a pap smear? Yes - Care Gap present.  Most recent result on file
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
## 2022-03-24 PROBLEM — G47.411 NARCOLEPSY WITH CATAPLEXY: Status: ACTIVE | Noted: 2022-03-22

## 2022-03-25 LAB
D-ALA 24H UR-MCNC: 1 MG/L
D-ALA 24H UR-MRATE: NORMAL MG/24 HR (ref 0.5–5.1)

## 2022-03-28 LAB
COPRO1 UR-MCNC: 13 UG/L (ref 0–15)
COPRO3 UR-MCNC: 58 UG/L (ref 0–49)
HEPTA-CP UR-MCNC: <1 UG/L (ref 0–2)
HEXA-CP UR-MCNC: <1 UG/L (ref 0–1)
PENTA-CP UR-MCNC: <1 UG/L (ref 0–2)
UROPOR UR-MCNC: 6 UG/L (ref 0–20)

## 2022-03-29 ENCOUNTER — PATIENT MESSAGE (OUTPATIENT)
Dept: NEUROLOGY | Age: 22
End: 2022-03-29

## 2022-03-29 ENCOUNTER — TELEPHONE (OUTPATIENT)
Dept: INTERNAL MEDICINE CLINIC | Age: 22
End: 2022-03-29

## 2022-03-29 NOTE — TELEPHONE ENCOUNTER
Pt states she is sched to have a CT on 03/31 at [facility]- she is asking if orders could be sent  MRI& [address] in [place] she is able to get it done cheaper there

## 2022-03-31 NOTE — PROGRESS NOTES
1: 05pm.  Discussed recent labs. Spoke with patient and she is willing to seek consult with a hepatic specialist.  Referral placed.

## 2022-04-09 LAB — PBG DEAMINASE, RBC, PBDGLT: NORMAL MU/G HB

## 2022-04-11 ENCOUNTER — TELEPHONE (OUTPATIENT)
Dept: INTERNAL MEDICINE CLINIC | Age: 22
End: 2022-04-11

## 2022-04-11 DIAGNOSIS — R29.898: ICD-10-CM

## 2022-04-11 DIAGNOSIS — M62.81 MUSCLE WEAKNESS: Primary | ICD-10-CM

## 2022-04-11 DIAGNOSIS — E80.20: ICD-10-CM

## 2022-04-11 NOTE — TELEPHONE ENCOUNTER
----- Message from Velma Shay sent at 4/11/2022  3:30 PM EDT -----  Subject: Referral Request    QUESTIONS   Reason for referral request? Patient is calling in and she is wanting a   referral for a hematologist. Patient is stating that she was given some   information by the nurse on [facility] however, when she called that place   they stated that she needs a referral. Please call her back as soon as   possible. Thank you. Has the physician seen you for this condition before? Yes  Select a date? 2022-03-30  Select the Provider the patient wants to be referred to, if known (PCP or   Specialist)? Forest Byrnes   Preferred Specialist (if applicable)? Do you already have an appointment scheduled? No  Additional Information for Provider?   ---------------------------------------------------------------------------  --------------  CALL BACK INFO  What is the best way for the office to contact you? OK to leave message on   [place], OK to respond with electronic message via [employer] portal (only   for patients who have registered [employer] account)  Preferred Call Back Phone Number? [phone]  ---------------------------------------------------------------------------  --------------  SCRIPT ANSWERS  Relationship to Patient?  Self

## 2022-04-11 NOTE — TELEPHONE ENCOUNTER
Patient states when she called Dr. Nancy Tracy office they told her she needs to see a hematologist not a hepatologist? Last referral was closed by there office please order new referral.

## 2022-04-12 PROBLEM — E80.20: Status: ACTIVE | Noted: 2022-04-12

## 2022-04-12 NOTE — TELEPHONE ENCOUNTER
ICD-10-CM ICD-9-CM    1. Muscle weakness  M62.81 728.87 REFERRAL TO HEMATOLOGY   2. Flaccid muscle tone  R29.898 728.9 REFERRAL TO HEMATOLOGY   3. Porphyrins urinary excretion increased ([facility])  E80.20 277.1      Update referral placed.

## 2022-04-12 NOTE — TELEPHONE ENCOUNTER
Patient reached and made aware of referral placed to Dr. Yvette Palmer office. Pt requested contact info be sent to her MyChart.

## 2022-04-14 ENCOUNTER — DOCUMENTATION ONLY (OUTPATIENT)
Dept: INTERNAL MEDICINE CLINIC | Age: 22
End: 2022-04-14

## 2022-04-14 ENCOUNTER — TELEPHONE (OUTPATIENT)
Dept: INTERNAL MEDICINE CLINIC | Age: 22
End: 2022-04-14

## 2022-04-14 DIAGNOSIS — R56.9 CONVULSIONS, UNSPECIFIED CONVULSION TYPE (HCC): ICD-10-CM

## 2022-04-14 DIAGNOSIS — M62.81 MUSCLE WEAKNESS (GENERALIZED): ICD-10-CM

## 2022-04-14 DIAGNOSIS — R55 SYNCOPE AND COLLAPSE: ICD-10-CM

## 2022-04-14 DIAGNOSIS — Z87.820 HISTORY OF TRAUMATIC BRAIN INJURY: Primary | ICD-10-CM

## 2022-04-14 DIAGNOSIS — Z87.820 HISTORY OF TRAUMATIC BRAIN INJURY: ICD-10-CM

## 2022-04-14 NOTE — PROGRESS NOTES
CT 4/12/2022 reveals normal.    Recommendations radiology, MRI of brain ordered. Contact patient and make her aware I have ordered an MRI. Please help patient get scheduled.   Thank you

## 2022-05-02 ENCOUNTER — TELEPHONE (OUTPATIENT)
Dept: INTERNAL MEDICINE CLINIC | Age: 22
End: 2022-05-02

## 2022-05-02 NOTE — TELEPHONE ENCOUNTER
Patient calling to reports  Increased episodes of immobility and increased HR. Patient reports being referred to Cardiology but her appointment is not until 5/31/22. Patient says her HR has been more elevated than usual. She reports more frequent intermittent episodes of immobility where her body feels heavy, numb and she is unable to move. She reports episodes happening Thursday, Friday and Sunday. During the Sunday episode her HR increased to 170s. She reports a resting HR today of 130. She wants further advice from  on what she should do.

## 2022-05-03 NOTE — TELEPHONE ENCOUNTER
Spoke with patient, she was informed that we will try to have her appointment scheduled sooner than 5-31-22. Patient states that she is experiencing SOB and chest pain currently. She states that when she stands her heart rate goes up to 125. Patient advised to have someone bring her to the ER for evaluation. Patient understands we are still going to see about a sooner appointment with cardiology.

## 2022-05-04 ENCOUNTER — OFFICE VISIT (OUTPATIENT)
Dept: CARDIOLOGY CLINIC | Age: 22
End: 2022-05-04
Payer: COMMERCIAL

## 2022-05-04 VITALS
WEIGHT: 205 LBS | OXYGEN SATURATION: 100 % | HEIGHT: 67 IN | BODY MASS INDEX: 32.18 KG/M2 | SYSTOLIC BLOOD PRESSURE: 134 MMHG | DIASTOLIC BLOOD PRESSURE: 88 MMHG | HEART RATE: 131 BPM

## 2022-05-04 DIAGNOSIS — R55 SYNCOPE, UNSPECIFIED SYNCOPE TYPE: Primary | ICD-10-CM

## 2022-05-04 PROCEDURE — 99204 OFFICE O/P NEW MOD 45 MIN: CPT | Performed by: INTERNAL MEDICINE

## 2022-05-04 PROCEDURE — 93000 ELECTROCARDIOGRAM COMPLETE: CPT | Performed by: INTERNAL MEDICINE

## 2022-05-04 RX ORDER — PROPRANOLOL HYDROCHLORIDE 20 MG/1
20 TABLET ORAL 3 TIMES DAILY
Qty: 90 TABLET | Refills: 6 | Status: SHIPPED | OUTPATIENT
Start: 2022-05-04 | End: 2022-08-04

## 2022-05-04 RX ORDER — DEXTROAMPHETAMINE SACCHARATE, AMPHETAMINE ASPARTATE, DEXTROAMPHETAMINE SULFATE AND AMPHETAMINE SULFATE 2.5; 2.5; 2.5; 2.5 MG/1; MG/1; MG/1; MG/1
10 TABLET ORAL 3 TIMES DAILY
COMMUNITY
Start: 2022-04-07 | End: 2022-08-04

## 2022-05-04 NOTE — PATIENT INSTRUCTIONS
[address] [facility] [address] [place], [address] [facility]  Office: [phone]   Fax: [address] [facility], [address] [facility]  Office: [phone]   Fax: [phone]      Dr. Bridgett Mccord,     Autonomic Dysautonomia      Start Inderal (propanolol )20 mg one tablet three times a day

## 2022-05-04 NOTE — PROGRESS NOTES
Samantha Guerrero    Syncope, CP, palps, tachycardia    HPI    Mac Mathur is a 22 y.o.  female here for several complaints. Progressively over last couple of years she has become almost nonfunctional. She has had 4 episodes of syncope, last episode ~2-3 months ago, witnessed at the GI dr office while seated with LOC. She no longer works or drives and comes with her mother today. She has chronic abdominal pain (being worked up for porphyria), narcolepsy/ cataplexy (mgt by Neuro). She's not had CV testing. Today her HR was 160s from sitting to standing/ walking and came down to 130s at rest seated, EKG sinus tach 130s. She needed assistance, could barely walk back to the room and just touching her/ putting leads on her chest she is writhing in pain and clutching her chest.    She says yes to all of our questions. Her complaints incl CP at rest, palps/ heart racing, SOB with dizziness and swelling. She had normal development and these issues didn't exist 2-3 yrs ago. Past Medical History:   Diagnosis Date    Acid reflux     Anxiety     Depression     Narcolepsy     PUD (peptic ulcer disease)     Spina bifida occulta     Syncope        Past Surgical History:   Procedure Laterality Date    HX COLONOSCOPY      HX ENDOSCOPY         Current Outpatient Medications   Medication Sig Dispense Refill    dextroamphetamine-amphetamine (AdderalL) 10 mg tablet Take 10 mg by mouth three (3) times daily.  cyanocobalamin, vitamin B-12, (VITAMIN B-12 PO) Take 2 Tablets by mouth daily.  ascorbic acid (VITAMIN C PO) Take 2 Tablets by mouth daily.  pantoprazole (PROTONIX) 20 mg tablet Take 1 Tablet by mouth daily. 90 Tablet 1    citalopram (CELEXA) 40 mg tablet Take 1 Tablet by mouth daily. 90 Tablet 1    ergocalciferol (ERGOCALCIFEROL) 1,250 mcg (50,000 unit) capsule Take 1 Capsule by mouth every seven (7) days.  12 Capsule 3    sodium,calcium,mag,pot oxybate (Xywav) 0.5 gram/mL soln Take by mouth nightly. Allergies   Allergen Reactions    Nuvigil [Armodafinil] Unknown (comments)     Involuntary muscle movements    Adhesive Rash    Sunosi [Solriamfetol] Unable to Obtain     hyperactive       Social History     Socioeconomic History    Marital status: SINGLE     Spouse name: Not on file    Number of children: Not on file    Years of education: Not on file    Highest education level: Not on file   Occupational History    Not on file   Tobacco Use    Smoking status: Never Smoker    Smokeless tobacco: Never Used   Vaping Use    Vaping Use: Never used   Substance and Sexual Activity    Alcohol use: Never    Drug use: Never    Sexual activity: Yes   Other Topics Concern    Not on file   Social History Narrative    Not on file     Social Determinants of Health     Financial Resource Strain:     Difficulty of Paying Living Expenses: Not on file   Food Insecurity:     Worried About Running Out of Food in the Last Year: Not on file    [place] of Food in the Last Year: Not on file   Transportation Needs:     Lack of Transportation (Medical): Not on file    Lack of Transportation (Non-Medical):  Not on file   Physical Activity:     Days of Exercise per Week: Not on file    Minutes of Exercise per Session: Not on file   Stress:     Feeling of Stress : Not on file   Social Connections:     Frequency of Communication with Friends and Family: Not on file    Frequency of Social Gatherings with Friends and Family: Not on file    Attends Anabaptist Services: Not on file    Active Member of Clubs or Organizations: Not on file    Attends Club or Organization Meetings: Not on file    Marital Status: Not on file   Intimate Partner Violence:     Fear of Current or Ex-Partner: Not on file    Emotionally Abused: Not on file    Physically Abused: Not on file    Sexually Abused: Not on file   Housing Stability:     Unable to Pay for Housing in the Last Year: Not on file    Number of Places Lived in the Last Year: Not on file    Unstable Housing in the Last Year: Not on file    used to work in     [place]: neg for preamature ASCVD or SCD in first degree relatives, has a cousin in his 25s with several med problems, and mothers fathers family has AFib and other heart issues    Review of Systems    14 pt Review of Systems is negative unless otherwise mentioned in the HPI. Wt Readings from Last 3 Encounters:   05/04/22 93 kg (205 lb)   03/22/22 95.5 kg (210 lb 9.6 oz)   01/28/22 93 kg (205 lb)     Temp Readings from Last 3 Encounters:   03/22/22 97.9 °F (36.6 °C) (Temporal)   01/28/22 98.9 °F (37.2 °C)   01/13/22 97.1 °F (36.2 °C) (Temporal)     BP Readings from Last 3 Encounters:   05/04/22 134/88   03/22/22 114/70   01/28/22 101/60     Pulse Readings from Last 3 Encounters:   05/04/22 (!) 131   03/22/22 (!) 111   01/28/22 63       Physical Exam:    Visit Vitals  /88 (BP 1 Location: Right arm, BP Patient Position: Sitting, BP Cuff Size: Small adult)   Pulse (!) 131   Ht 5' 7\" (1.702 m)   Wt 93 kg (205 lb)   SpO2 100%   BMI 32.11 kg/m²      Physical Exam  HENT:      Head: Normocephalic and atraumatic. Eyes:      Pupils: Pupils are equal, round, and reactive to light. Cardiovascular:      Rate and Rhythm: Normal rate and regular rhythm. Heart sounds: Normal heart sounds. No murmur heard. No friction rub. No gallop. Pulmonary:      Effort: Pulmonary effort is normal. No respiratory distress. Breath sounds: Normal breath sounds. No wheezing or rales. Chest:      Chest wall: No tenderness. Abdominal:      General: Bowel sounds are normal.      Palpations: Abdomen is soft. Musculoskeletal:         General: No tenderness. Skin:     General: Skin is warm and dry. Neurological:      Mental Status: She is alert and oriented to person, place, and time.          EKG today shows: Sinus tach 130s, normal axis and intervals, no ST segment abnormalities, no prior for comparison    Impression and Plan:  Anuradha Fuentes is a 22 y.o. with:    Autonomic dysfunction/ IST/ POTS  Syncope, likely neurocardiogenic  Atypical CP, unlikely to be cardiac etiology  Chronic abd pain  Narcolepsy/ cataplexy    1.) Clearly autonomic dysfunction, likely POTS, HR documented 160s changed positions but couldn't document formal orthostatics as her cataplexy/ almost fell and told MA couldn't move limbs  2.) As tachycardia predominant symptom, will try Propanolol first, 20 mg up to TID  3.) Echo  4.) Refer to [address] [place] [address]  5.) RTC ~ 3 months NP, 6 months with me, can titrate up Propanolol if helping, if not may need to try Ivabradine    Significant time spent with pt and her mother  Wrote name of Propanolol as mother says has to dw Neuro due to cataplexy med  More education is needed on lifestyle changes, unable to delve in after >60 mins already spent face to face alone    Thank you for allowing me to participate in the care of your patient, please do not hesitate to call with questions or concerns.     [address],    Danilo Da Silva, DO

## 2022-05-04 NOTE — PROGRESS NOTES
Teresa Spurling presents today for   Chief Complaint   Patient presents with    New Patient     Ref by PCP for Syncope       Teresa Spurling preferred language for health care discussion is english/other. Is someone accompanying this pt? yes    Is the patient using any DME equipment during [address]? no    Depression Screening:  3 most recent PHQ Screens 5/4/2022   Little interest or pleasure in doing things Not at all   Feeling down, depressed, irritable, or hopeless Not at all   Total Score PHQ 2 0   Trouble falling or staying asleep, or sleeping too much -   Feeling tired or having little energy -   Poor appetite, weight loss, or overeating -   Feeling bad about yourself - or that you are a failure or have let yourself or your family down -   Trouble concentrating on things such as school, work, reading, or watching TV -   Moving or speaking so slowly that other people could have noticed; or the opposite being so fidgety that others notice -   Thoughts of being better off dead, or hurting yourself in some way -   PHQ 9 Score -   How difficult have these problems made it for you to do your work, take care of your home and get along with others -       Learning Assessment:  Learning Assessment 5/4/2022   PRIMARY LEARNER Patient   PRIMARY LANGUAGE ENGLISH   LEARNER PREFERENCE PRIMARY DEMONSTRATION   ANSWERED BY patient   RELATIONSHIP SELF       Abuse Screening:  Abuse Screening Questionnaire 5/4/2022   Do you ever feel afraid of your partner? N   Are you in a relationship with someone who physically or mentally threatens you? N   Is it safe for you to go home? Y             Pt currently taking Anticoagulant therapy? no    Pt currently taking Antiplatelet therapy ? no      Coordination of Care:  1. Have you been to the ER, urgent care clinic since your last visit? Hospitalized since your last visit? no    2.  Have you seen or consulted any other health care providers outside of the [address] [place] [address] since your last visit? Include any pap smears or colon screening.  no

## 2022-05-17 ENCOUNTER — TELEPHONE (OUTPATIENT)
Dept: INTERNAL MEDICINE CLINIC | Age: 22
End: 2022-05-17

## 2022-05-17 NOTE — TELEPHONE ENCOUNTER
Patient called and states that she is in desperate need of a psychiatrist. She states that she had been given a list of recommendations; however, she has been unable to find one that accepts her insurance and has availability. Patient is requesting a return call from a nurse about what she can do and can be reached at [phone].     Please advise, thank you

## 2022-05-17 NOTE — TELEPHONE ENCOUNTER
Informed patient I will try to find a psych/mental health office that will accept new pt, pt states she is willing to drive she just needs to est care with one. Did find one office, [address] [place] [address] on [facility] but they are not seeing new pt's until July.

## 2022-05-19 NOTE — TELEPHONE ENCOUNTER
Pt given number to [facility] to call and schedule appt. All other offices I contacted had 3-5 month waiting periods.

## 2022-05-20 ENCOUNTER — TELEPHONE (OUTPATIENT)
Dept: CARDIOLOGY CLINIC | Age: 22
End: 2022-05-20

## 2022-05-20 NOTE — TELEPHONE ENCOUNTER
----- Message from Kenia Allan sent at 5/20/2022  9:59 AM EDT -----  Regarding: medication  Patient called 5- to make Allan Herrera aware that her Beta blocker that she is on is causing Depression and dizziness. Patient is asking for a alternative medication if possible.

## 2022-05-20 NOTE — TELEPHONE ENCOUNTER
Spoke to patient , she states that she has already been dealing with depression that wasn't the issue. Her issue with the beta blocker was extreme fatigue and increased sob. She did try to decrease the medication to 1/2 a tab and it did up slightly but still had the fatigue. Her heart rates however were controlled with the medication.      I will forward to Dr Silverio Levy

## 2022-06-01 NOTE — TELEPHONE ENCOUNTER
DO Ekaterina Amaro [place], RN  Caller: Unspecified (1 week ago)  If the Propanolol isnt helping (and actually causing new/ worsening problems)   Does she want to try Ivabradine   We can try to Rx it for POTS (or she can wait until she goes to [address]?)

## 2022-06-01 NOTE — TELEPHONE ENCOUNTER
Spoke to patient , she is only taking 1/2 tab until the corlanor is approved or denied and/or she is seen at [facility].       Sent rx to pharm

## 2022-06-13 ENCOUNTER — TELEPHONE (OUTPATIENT)
Dept: CARDIOLOGY CLINIC | Age: 22
End: 2022-06-13

## 2022-06-24 ENCOUNTER — TELEPHONE (OUTPATIENT)
Dept: CARDIOLOGY CLINIC | Age: 22
End: 2022-06-24

## 2022-06-24 NOTE — TELEPHONE ENCOUNTER
----- Message from Mera Angulo DO sent at 6/22/2022  9:39 AM EDT -----  Echo is normal  ----- Message -----  From: June Rangel RN  Sent: 6/22/2022   8:40 AM EDT  To: Mera Angulo DO    Per your last note :      Autonomic dysfunction/ IST/ POTS   Syncope, likely neurocardiogenic   Atypical CP, unlikely to be cardiac etiology   Chronic abd pain   Narcolepsy/ cataplexy       1.) Clearly autonomic dysfunction, likely POTS, HR documented 160s changed positions but couldn't document formal orthostatics as her cataplexy/ almost fell and told MA couldn't move limbs   2.) As tachycardia predominant symptom, will try Propanolol first, 20 mg up to TID   3.) Echo   4.) Refer to [address] [place] [address]   5.) RTC ~ 3 months NP, 6 months with me, can titrate up Propanolol if helping, if not may need to try Ivabradine

## 2022-07-11 ENCOUNTER — TELEPHONE (OUTPATIENT)
Dept: CARDIOLOGY CLINIC | Age: 22
End: 2022-07-11

## 2022-07-11 NOTE — TELEPHONE ENCOUNTER
Received call from [facility], [facility] in regards to referral for this patient. Rep sts they haven't received the Office notes or demographics and imaging. Requested that information be sent to fax# [phone]. Information faxed.

## 2022-08-04 ENCOUNTER — OFFICE VISIT (OUTPATIENT)
Dept: CARDIOLOGY CLINIC | Age: 22
End: 2022-08-04
Payer: COMMERCIAL

## 2022-08-04 VITALS
SYSTOLIC BLOOD PRESSURE: 138 MMHG | DIASTOLIC BLOOD PRESSURE: 84 MMHG | BODY MASS INDEX: 34.21 KG/M2 | OXYGEN SATURATION: 98 % | HEIGHT: 67 IN | HEART RATE: 82 BPM | WEIGHT: 218 LBS

## 2022-08-04 DIAGNOSIS — R55 SYNCOPE, UNSPECIFIED SYNCOPE TYPE: Primary | ICD-10-CM

## 2022-08-04 DIAGNOSIS — R07.9 CHEST PAIN, UNSPECIFIED TYPE: ICD-10-CM

## 2022-08-04 PROCEDURE — 99215 OFFICE O/P EST HI 40 MIN: CPT | Performed by: INTERNAL MEDICINE

## 2022-08-04 RX ORDER — PROPRANOLOL HYDROCHLORIDE 10 MG/1
10 TABLET ORAL 3 TIMES DAILY
COMMUNITY

## 2022-08-04 NOTE — PROGRESS NOTES
Korin Espinoza presents today for   Chief Complaint   Patient presents with    Follow-up     3 month follow up       Korin Espinoza preferred language for health care discussion is english/other. Is someone accompanying this pt? yes    Is the patient using any DME equipment during [address]? no    Depression Screening:  3 most recent PHQ Screens 8/4/2022   Little interest or pleasure in doing things Not at all   Feeling down, depressed, irritable, or hopeless Not at all   Total Score PHQ 2 0   Trouble falling or staying asleep, or sleeping too much -   Feeling tired or having little energy -   Poor appetite, weight loss, or overeating -   Feeling bad about yourself - or that you are a failure or have let yourself or your family down -   Trouble concentrating on things such as school, work, reading, or watching TV -   Moving or speaking so slowly that other people could have noticed; or the opposite being so fidgety that others notice -   Thoughts of being better off dead, or hurting yourself in some way -   PHQ 9 Score -   How difficult have these problems made it for you to do your work, take care of your home and get along with others -       Learning Assessment:  Learning Assessment 8/4/2022   PRIMARY LEARNER Patient   PRIMARY LANGUAGE ENGLISH   LEARNER PREFERENCE PRIMARY DEMONSTRATION   ANSWERED BY patient   RELATIONSHIP SELF       Abuse Screening:  Abuse Screening Questionnaire 8/4/2022   Do you ever feel afraid of your partner? N   Are you in a relationship with someone who physically or mentally threatens you? N   Is it safe for you to go home? Y             Pt currently taking Anticoagulant therapy? no    Pt currently taking Antiplatelet therapy ? no      Coordination of Care:  1. Have you been to the ER, urgent care clinic since your last visit? Hospitalized since your last visit? no    2.  Have you seen or consulted any other health care providers outside of the [address] [place] [address] since your last visit? Include any pap smears or colon screening.  no

## 2022-08-04 NOTE — PROGRESS NOTES
Samantha Guerrero    Syncope, CP, palps, tachycardia    HPI    Andrez Zaldivar is a 22 y.o.  female here for several complaints. Progressively over last couple of years she has become almost nonfunctional. She has had 4 episodes of syncope, last episode ~2-3 months ago, witnessed at the GI dr office while seated with LOC. She no longer works or drives and comes with her mother to appts. She has chronic abdominal pain (being worked up for porphyria), narcolepsy/ cataplexy (mgt by Neuro). She's not had CV testing. When I first met her, she appeared drowsy. HR was 160s from sitting to standing/ walking and came down to 130s at rest seated, EKG sinus tach 130s. She needed assistance, could barely walk back to the room and just touching her/ putting leads on her chest she is writhing in pain and clutching her chest.    Today she looks like a completely different person. Very upbeat, talkative, interactive. HR is normal, overall feeling much better and feels it was Adderall she was taking that made her worse even to the point of extreme anger/ altering her mood. Feels propanolol 10 mg a day helps her palpitations. Past Medical History:   Diagnosis Date    Acid reflux     Anxiety     Depression     Narcolepsy     PUD (peptic ulcer disease)     Spina bifida occulta     Syncope        Past Surgical History:   Procedure Laterality Date    HX COLONOSCOPY      HX ENDOSCOPY         Current Outpatient Medications   Medication Sig Dispense Refill    propranoloL (INDERAL) 10 mg tablet Take 10 mg by mouth three (3) times daily. cyanocobalamin, vitamin B-12, (VITAMIN B-12 PO) Take 2 Tablets by mouth daily. ascorbic acid (VITAMIN C PO) Take 2 Tablets by mouth daily. pantoprazole (PROTONIX) 20 mg tablet Take 1 Tablet by mouth daily. 90 Tablet 1    citalopram (CELEXA) 40 mg tablet Take 1 Tablet by mouth daily.  90 Tablet 1    ergocalciferol (ERGOCALCIFEROL) 1,250 mcg (50,000 unit) capsule Take 1 Capsule by mouth every seven (7) days. 12 Capsule 3    sodium,calcium,mag,pot oxybate (Xywav) 0.5 gram/mL soln Take  by mouth nightly. Allergies   Allergen Reactions    Nuvigil [Armodafinil] Unknown (comments)     Involuntary muscle movements    Adhesive Rash    Sunosi [Solriamfetol] Unable to Obtain     hyperactive       Social History     Socioeconomic History    Marital status: SINGLE     Spouse name: Not on file    Number of children: Not on file    Years of education: Not on file    Highest education level: Not on file   Occupational History    Not on file   Tobacco Use    Smoking status: Never    Smokeless tobacco: Never   Vaping Use    Vaping Use: Never used   Substance and Sexual Activity    Alcohol use: Never    Drug use: Never    Sexual activity: Yes   Other Topics Concern    Not on file   Social History Narrative    Not on file     Social Determinants of Health     Financial Resource Strain: Not on file   Food Insecurity: Not on file   Transportation Needs: Not on file   Physical Activity: Not on file   Stress: Not on file   Social Connections: Not on file   Intimate Partner Violence: Not on file   Housing Stability: Not on file    used to work in     FH: neg for preamature ASCVD or SCD in first degree relatives, has a cousin in his 25s with several med problems, and mothers fathers family has AFib and other heart issues    Review of Systems    14 pt Review of Systems is negative unless otherwise mentioned in the HPI.     Wt Readings from Last 3 Encounters:   08/04/22 98.9 kg (218 lb)   06/14/22 93 kg (205 lb)   05/04/22 93 kg (205 lb)     Temp Readings from Last 3 Encounters:   03/22/22 97.9 °F (36.6 °C) (Temporal)   01/28/22 98.9 °F (37.2 °C)   01/13/22 97.1 °F (36.2 °C) (Temporal)     BP Readings from Last 3 Encounters:   08/04/22 138/84   06/14/22 134/88   05/04/22 134/88     Pulse Readings from Last 3 Encounters:   08/04/22 82   05/04/22 (!) 131   03/22/22 (!) 111       Physical Exam:    Visit Vitals  /84 (BP 1 Location: Left upper arm, BP Patient Position: Sitting, BP Cuff Size: Small adult)   Pulse 82   Ht 5' 7\" (1.702 m)   Wt 98.9 kg (218 lb)   SpO2 98%   BMI 34.14 kg/m²      Physical Exam  HENT:      Head: Normocephalic and atraumatic. Eyes:      Pupils: Pupils are equal, round, and reactive to light. Cardiovascular:      Rate and Rhythm: Normal rate and regular rhythm. Heart sounds: Normal heart sounds. No murmur heard. No friction rub. No gallop. Pulmonary:      Effort: Pulmonary effort is normal. No respiratory distress. Breath sounds: Normal breath sounds. No wheezing or rales. Chest:      Chest wall: No tenderness. Abdominal:      General: Bowel sounds are normal.      Palpations: Abdomen is soft. Musculoskeletal:         General: No tenderness. Skin:     General: Skin is warm and dry. Neurological:      Mental Status: She is alert and oriented to person, place, and time. EKG today shows: Sinus tach 130s, normal axis and intervals, no ST segment abnormalities, no prior for comparison    Impression and Plan:  Lizzeth Fenton is a 22 y.o. with:    Autonomic dysfunction/ IST/ POTS  Syncope, likely neurocardiogenic  Atypical CP, unlikely to be cardiac etiology  Chronic abd pain  Narcolepsy/ cataplexy    1.) Clearly autonomic dysfunction, likely POTS, HR documented 160s changed positions but couldn't document formal orthostatics as her cataplexy/ almost fell and told MA couldn't move limbs  2.) As tachycardia predominant symptom, Propanolol 10 mg a day (this is her dosing)  3.) Echo was normal  4.) Referred to Autonomic Dysfunction [facility]  5.) RTC 6 months    Significant time spent with pt and her mother  I dont think needs formal tilt table  Should recheck orthostatics again in the future  >45 mins    Thank you for allowing me to participate in the care of your patient, please do not hesitate to call with questions or concerns.     Regards,    Tosha CLARKE Christy Morris,

## 2022-08-23 DIAGNOSIS — K21.9 GASTROESOPHAGEAL REFLUX DISEASE WITHOUT ESOPHAGITIS: ICD-10-CM

## 2022-08-23 DIAGNOSIS — F41.8 ANXIETY WITH DEPRESSION: ICD-10-CM

## 2022-08-23 RX ORDER — CITALOPRAM 40 MG/1
TABLET, FILM COATED ORAL
Qty: 90 TABLET | Refills: 0 | Status: SHIPPED | OUTPATIENT
Start: 2022-08-23 | End: 2022-10-06 | Stop reason: SDUPTHER

## 2022-08-23 RX ORDER — PANTOPRAZOLE SODIUM 20 MG/1
TABLET, DELAYED RELEASE ORAL
Qty: 90 TABLET | Refills: 0 | Status: SHIPPED | OUTPATIENT
Start: 2022-08-23 | End: 2022-10-06 | Stop reason: SDUPTHER

## 2022-10-06 ENCOUNTER — OFFICE VISIT (OUTPATIENT)
Dept: INTERNAL MEDICINE CLINIC | Age: 22
End: 2022-10-06
Payer: COMMERCIAL

## 2022-10-06 VITALS
SYSTOLIC BLOOD PRESSURE: 114 MMHG | OXYGEN SATURATION: 98 % | HEART RATE: 89 BPM | DIASTOLIC BLOOD PRESSURE: 68 MMHG | WEIGHT: 232.8 LBS | RESPIRATION RATE: 18 BRPM | HEIGHT: 67 IN | BODY MASS INDEX: 36.54 KG/M2 | TEMPERATURE: 97.8 F

## 2022-10-06 DIAGNOSIS — E80.20 PORPHYRIA, UNSPECIFIED PORPHYRIA TYPE (HCC): ICD-10-CM

## 2022-10-06 DIAGNOSIS — K21.9 GASTROESOPHAGEAL REFLUX DISEASE WITHOUT ESOPHAGITIS: ICD-10-CM

## 2022-10-06 DIAGNOSIS — G47.411 NARCOLEPSY WITH CATAPLEXY: ICD-10-CM

## 2022-10-06 DIAGNOSIS — R63.5 WEIGHT GAIN: ICD-10-CM

## 2022-10-06 DIAGNOSIS — F41.8 ANXIETY WITH DEPRESSION: Primary | ICD-10-CM

## 2022-10-06 PROCEDURE — 99213 OFFICE O/P EST LOW 20 MIN: CPT | Performed by: NURSE PRACTITIONER

## 2022-10-06 RX ORDER — PANTOPRAZOLE SODIUM 20 MG/1
20 TABLET, DELAYED RELEASE ORAL DAILY
Qty: 90 TABLET | Refills: 1 | Status: SHIPPED | OUTPATIENT
Start: 2022-10-06

## 2022-10-06 RX ORDER — CITALOPRAM 40 MG/1
40 TABLET, FILM COATED ORAL DAILY
Qty: 90 TABLET | Refills: 1 | Status: SHIPPED | OUTPATIENT
Start: 2022-10-06

## 2022-10-06 NOTE — PROGRESS NOTES
Chief Complaint   Patient presents with    Medication Evaluation     Follow up     1. \"Have you been to the ER, urgent care clinic since your last visit? Hospitalized since your last visit? \" No    2. \"Have you seen or consulted any other health care providers outside of the [address] [place] [address] since your last visit? \"  Yes, Neurology      3. For patients aged 39-70: Has the patient had a colonoscopy / FIT/ Cologuard? NA - based on age      If the patient is female:    4. For patients aged 41-77: Has the patient had a mammogram within the past 2 years? NA - based on age or sex      11. For patients aged 21-65: Has the patient had a pap smear?  NA - based on age or sex
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
## 2022-11-01 DIAGNOSIS — K21.9 GASTROESOPHAGEAL REFLUX DISEASE WITHOUT ESOPHAGITIS: ICD-10-CM

## 2022-11-01 DIAGNOSIS — F41.8 ANXIETY WITH DEPRESSION: ICD-10-CM

## 2022-11-01 RX ORDER — PANTOPRAZOLE SODIUM 20 MG/1
20 TABLET, DELAYED RELEASE ORAL DAILY
Qty: 90 TABLET | Refills: 1 | OUTPATIENT
Start: 2022-11-01

## 2022-11-01 RX ORDER — CITALOPRAM 40 MG/1
40 TABLET, FILM COATED ORAL DAILY
Qty: 90 TABLET | Refills: 1 | OUTPATIENT
Start: 2022-11-01

## 2022-11-01 NOTE — TELEPHONE ENCOUNTER
Protonix and Celexa were sent on 10/6/22 for #90 with 1 refill      For [address] [facility] in place:   Recommendation Provided To:    Intervention Detail: New Rx: 2, reason: Patient Preference  Gap Closed?:   Intervention Accepted By:   Time Spent (min): 5

## 2022-12-30 ENCOUNTER — TELEPHONE (OUTPATIENT)
Dept: INTERNAL MEDICINE CLINIC | Age: 22
End: 2022-12-30

## 2022-12-30 DIAGNOSIS — E55.9 VITAMIN D DEFICIENCY: ICD-10-CM

## 2023-01-02 RX ORDER — ERGOCALCIFEROL 1.25 MG/1
50000 CAPSULE ORAL
Qty: 4 CAPSULE | Refills: 17 | OUTPATIENT
Start: 2023-01-02

## 2023-02-02 ENCOUNTER — OFFICE VISIT (OUTPATIENT)
Dept: CARDIOLOGY CLINIC | Age: 23
End: 2023-02-02
Payer: COMMERCIAL

## 2023-02-02 VITALS
BODY MASS INDEX: 39.24 KG/M2 | OXYGEN SATURATION: 99 % | SYSTOLIC BLOOD PRESSURE: 122 MMHG | DIASTOLIC BLOOD PRESSURE: 76 MMHG | WEIGHT: 250 LBS | HEART RATE: 90 BPM | HEIGHT: 67 IN

## 2023-02-02 DIAGNOSIS — G90.A POTS (POSTURAL ORTHOSTATIC TACHYCARDIA SYNDROME): Primary | ICD-10-CM

## 2023-02-02 DIAGNOSIS — R55 SYNCOPE, UNSPECIFIED SYNCOPE TYPE: ICD-10-CM

## 2023-02-02 PROCEDURE — 99214 OFFICE O/P EST MOD 30 MIN: CPT | Performed by: INTERNAL MEDICINE

## 2023-02-02 RX ORDER — LANOLIN ALCOHOL/MO/W.PET/CERES
1000 CREAM (GRAM) TOPICAL DAILY
COMMUNITY

## 2023-02-02 RX ORDER — ASCORBIC ACID 500 MG
500 TABLET ORAL DAILY
COMMUNITY

## 2023-02-02 RX ORDER — BACLOFEN 20 MG
TABLET ORAL EVERY OTHER DAY
COMMUNITY

## 2023-02-02 NOTE — PROGRESS NOTES
Emily Melendez presents today for   Chief Complaint   Patient presents with    Follow-up     6 month follow up. Pt states fainted 1x last year. Chest Pain     Left pressure/tightness chest pain that comes and goes. Palpitations     Racing,fluttering,skipping and pounding palpitations that comes and goes. Shortness of Breath     SOB with exertion that comes and goes. Leg Swelling     Bilateral lower leg swelling that comes and goes. Emily Melendez preferred language for health care discussion is english/other. Is someone accompanying this pt? no    Is the patient using any DME equipment during [address]? no    Depression Screening:  3 most recent PHQ Screens 2/2/2023   Little interest or pleasure in doing things Not at all   Feeling down, depressed, irritable, or hopeless Not at all   Total Score PHQ 2 0   Trouble falling or staying asleep, or sleeping too much -   Feeling tired or having little energy -   Poor appetite, weight loss, or overeating -   Feeling bad about yourself - or that you are a failure or have let yourself or your family down -   Trouble concentrating on things such as school, work, reading, or watching TV -   Moving or speaking so slowly that other people could have noticed; or the opposite being so fidgety that others notice -   Thoughts of being better off dead, or hurting yourself in some way -   PHQ 9 Score -   How difficult have these problems made it for you to do your work, take care of your home and get along with others -       Learning Assessment:  Learning Assessment 2/2/2023   PRIMARY LEARNER Patient   PRIMARY LANGUAGE ENGLISH   LEARNER PREFERENCE PRIMARY DEMONSTRATION   ANSWERED BY patient   RELATIONSHIP SELF       Abuse Screening:  Abuse Screening Questionnaire 2/2/2023   Do you ever feel afraid of your partner? N   Are you in a relationship with someone who physically or mentally threatens you? N   Is it safe for you to go home?  Y       Fall Risk  No flowsheet data found. Pt currently taking Anticoagulant therapy? no    Pt currently taking Antiplatelet therapy ? no      Coordination of Care:  1. Have you been to the ER, urgent care clinic since your last visit? Hospitalized since your last visit? no    2. Have you seen or consulted any other health care providers outside of the [address] [place] [address] since your last visit? Include any pap smears or colon screening.  no

## 2023-02-02 NOTE — PROGRESS NOTES
Yovani Cueva    Syncope, CP, palps, tachycardia    HPI    Yovani Cueva is a 25 y.o.  female here for several complaints. Progressively over last couple of years she has become almost nonfunctional. She has had 4 episodes of syncope, last episode ~2-3 months ago, witnessed at the GI dr office while seated with LOC. She no longer works or drives and comes with her mother to appts. She has chronic abdominal pain (being worked up for porphyria), narcolepsy/ cataplexy (mgt by Neuro). She's not had CV testing. When I first met her, she appeared drowsy. HR was 160s from sitting to standing/ walking and came down to 130s at rest seated, EKG sinus tach 130s. She needed assistance, could barely walk back to the room and just touching her/ putting leads on her chest she is writhing in pain and clutching her chest.    Today she looks like a completely different person. Very upbeat, talkative, interactive. HR is normal, overall feeling much better and feels it was Adderall she was taking that made her worse even to the point of extreme anger/ altering her mood. Feels propanolol 10 mg a day helps her palpitations. Past Medical History:   Diagnosis Date    Acid reflux     Anxiety     Depression     Narcolepsy     PUD (peptic ulcer disease)     Spina bifida occulta     Syncope        Past Surgical History:   Procedure Laterality Date    HX COLONOSCOPY      HX ENDOSCOPY         Current Outpatient Medications   Medication Sig Dispense Refill    multivit with calcium,iron,min (WOMEN'S DAILY MULTIVITAMIN PO) Take  by mouth daily. cyanocobalamin 1,000 mcg tablet Take 1,000 mcg by mouth daily. ascorbic acid, vitamin C, (Vitamin C) 500 mg tablet Take 500 mg by mouth daily. magnesium oxide 500 mg tab Take  by mouth every other day. citalopram (CELEXA) 40 mg tablet Take 1 Tablet by mouth daily. 90 Tablet 1    pantoprazole (PROTONIX) 20 mg tablet Take 1 Tablet by mouth daily.  90 Tablet 1 propranoloL (INDERAL) 10 mg tablet Take 10 mg by mouth daily. sodium,calcium,mag,pot oxybate (Xywav) 0.5 gram/mL soln Take  by mouth nightly. Allergies   Allergen Reactions    Nuvigil [Armodafinil] Unknown (comments)     Involuntary muscle movements    Adhesive Rash    Sunosi [Solriamfetol] Unable to Obtain     hyperactive       Social History     Socioeconomic History    Marital status: SINGLE     Spouse name: Not on file    Number of children: Not on file    Years of education: Not on file    Highest education level: Not on file   Occupational History    Not on file   Tobacco Use    Smoking status: Never    Smokeless tobacco: Never   Vaping Use    Vaping Use: Never used   Substance and Sexual Activity    Alcohol use: Never    Drug use: Never    Sexual activity: Yes   Other Topics Concern    Not on file   Social History Narrative    Not on file     Social Determinants of Health     Financial Resource Strain: Not on file   Food Insecurity: Not on file   Transportation Needs: Not on file   Physical Activity: Not on file   Stress: Not on file   Social Connections: Not on file   Intimate Partner Violence: Not on file   Housing Stability: Not on file    used to work in     FH: neg for preamature ASCVD or SCD in first degree relatives, has a cousin in his 25s with several med problems, and mothers fathers family has AFib and other heart issues    Review of Systems    14 pt Review of Systems is negative unless otherwise mentioned in the HPI.     Wt Readings from Last 3 Encounters:   02/02/23 113.4 kg (250 lb)   10/06/22 105.6 kg (232 lb 12.8 oz)   08/04/22 98.9 kg (218 lb)     Temp Readings from Last 3 Encounters:   10/06/22 97.8 °F (36.6 °C) (Temporal)   03/22/22 97.9 °F (36.6 °C) (Temporal)   01/28/22 98.9 °F (37.2 °C)     BP Readings from Last 3 Encounters:   02/02/23 122/76   10/06/22 114/68   08/04/22 138/84     Pulse Readings from Last 3 Encounters:   02/02/23 90   10/06/22 89   08/04/22 82 Physical Exam:    Visit Vitals  /76 (BP 1 Location: Right upper arm, BP Patient Position: Sitting, BP Cuff Size: Large adult)   Pulse 90   Ht 5' 7\" (1.702 m)   Wt 113.4 kg (250 lb)   SpO2 99%   BMI 39.16 kg/m²      Physical Exam  HENT:      Head: Normocephalic and atraumatic. Eyes:      Pupils: Pupils are equal, round, and reactive to light. Cardiovascular:      Rate and Rhythm: Normal rate and regular rhythm. Heart sounds: Normal heart sounds. No murmur heard. No friction rub. No gallop. Pulmonary:      Effort: Pulmonary effort is normal. No respiratory distress. Breath sounds: Normal breath sounds. No wheezing or rales. Chest:      Chest wall: No tenderness. Abdominal:      General: Bowel sounds are normal.      Palpations: Abdomen is soft. Musculoskeletal:         General: No tenderness. Skin:     General: Skin is warm and dry. Neurological:      Mental Status: She is alert and oriented to person, place, and time.        EKG today shows: Sinus tach 130s, normal axis and intervals, no ST segment abnormalities, no prior for comparison    Impression and Plan:  Estrellita Biswas is a 25 y.o. with:    Autonomic dysfunction/ IST/ POTS  Syncope, likely neurocardiogenic  Atypical CP, unlikely to be cardiac etiology  Chronic abd pain  Narcolepsy/ cataplexy    1.) Clearly autonomic dysfunction, likely POTS, HR documented 160s changed positions but couldn't document formal orthostatics as her cataplexy/ almost fell and told MA couldn't move limbs at her initial appt with me  2.) As tachycardia predominant symptom, Propanolol 10 mg a day (this is her dosing)  3.) Echo was normal  4.) Referred to [address] [place] [address] but feeling better with BB and didn't want to wait a year  5.) Continue current plan, RTC 6 months with orthostatics    Thank you for allowing me to participate in the care of your patient, please do not hesitate to call with questions or concerns.     [address],    Pattricia Ganser, DO

## 2023-03-30 ENCOUNTER — OFFICE VISIT (OUTPATIENT)
Age: 23
End: 2023-03-30
Payer: COMMERCIAL

## 2023-03-30 VITALS
TEMPERATURE: 97.6 F | DIASTOLIC BLOOD PRESSURE: 71 MMHG | BODY MASS INDEX: 38.92 KG/M2 | SYSTOLIC BLOOD PRESSURE: 118 MMHG | WEIGHT: 248 LBS | HEART RATE: 77 BPM | OXYGEN SATURATION: 98 % | RESPIRATION RATE: 18 BRPM | HEIGHT: 67 IN

## 2023-03-30 DIAGNOSIS — E53.8 VITAMIN B 12 DEFICIENCY: ICD-10-CM

## 2023-03-30 DIAGNOSIS — E53.8 VITAMIN B12 DEFICIENCY: ICD-10-CM

## 2023-03-30 DIAGNOSIS — F41.8 ANXIETY WITH DEPRESSION: Primary | ICD-10-CM

## 2023-03-30 DIAGNOSIS — G90.A POTS (POSTURAL ORTHOSTATIC TACHYCARDIA SYNDROME): ICD-10-CM

## 2023-03-30 DIAGNOSIS — E55.9 VITAMIN D DEFICIENCY: ICD-10-CM

## 2023-03-30 DIAGNOSIS — E80.20: ICD-10-CM

## 2023-03-30 DIAGNOSIS — G47.411 NARCOLEPSY WITH CATAPLEXY: ICD-10-CM

## 2023-03-30 DIAGNOSIS — K21.9 GASTROESOPHAGEAL REFLUX DISEASE WITHOUT ESOPHAGITIS: ICD-10-CM

## 2023-03-30 PROCEDURE — 99213 OFFICE O/P EST LOW 20 MIN: CPT | Performed by: NURSE PRACTITIONER

## 2023-03-30 RX ORDER — MAGNESIUM OXIDE 400 MG/1
400 TABLET ORAL DAILY
Qty: 30 TABLET | Refills: 0 | COMMUNITY
Start: 2023-03-30

## 2023-03-30 RX ORDER — PANTOPRAZOLE SODIUM 20 MG/1
20 TABLET, DELAYED RELEASE ORAL DAILY
Qty: 93 TABLET | Refills: 3 | Status: SHIPPED | OUTPATIENT
Start: 2023-03-30

## 2023-03-30 RX ORDER — CITALOPRAM 40 MG/1
40 TABLET ORAL DAILY
Qty: 93 TABLET | Refills: 3 | Status: SHIPPED | OUTPATIENT
Start: 2023-03-30

## 2023-03-30 RX ORDER — M-VIT,TX,IRON,MINS/CALC/FOLIC 27MG-0.4MG
1 TABLET ORAL DAILY
COMMUNITY

## 2023-03-30 SDOH — ECONOMIC STABILITY: INCOME INSECURITY: HOW HARD IS IT FOR YOU TO PAY FOR THE VERY BASICS LIKE FOOD, HOUSING, MEDICAL CARE, AND HEATING?: NOT HARD AT ALL

## 2023-03-30 SDOH — ECONOMIC STABILITY: FOOD INSECURITY: WITHIN THE PAST 12 MONTHS, THE FOOD YOU BOUGHT JUST DIDN'T LAST AND YOU DIDN'T HAVE MONEY TO GET MORE.: NEVER TRUE

## 2023-03-30 SDOH — ECONOMIC STABILITY: FOOD INSECURITY: WITHIN THE PAST 12 MONTHS, YOU WORRIED THAT YOUR FOOD WOULD RUN OUT BEFORE YOU GOT MONEY TO BUY MORE.: NEVER TRUE

## 2023-03-30 SDOH — ECONOMIC STABILITY: HOUSING INSECURITY
IN THE LAST 12 MONTHS, WAS THERE A TIME WHEN YOU DID NOT HAVE A STEADY PLACE TO SLEEP OR SLEPT IN A SHELTER (INCLUDING NOW)?: NO

## 2023-03-30 ASSESSMENT — PATIENT HEALTH QUESTIONNAIRE - PHQ9
2. FEELING DOWN, DEPRESSED OR HOPELESS: 0
1. LITTLE INTEREST OR PLEASURE IN DOING THINGS: 0
SUM OF ALL RESPONSES TO PHQ QUESTIONS 1-9: 0
SUM OF ALL RESPONSES TO PHQ9 QUESTIONS 1 & 2: 0
SUM OF ALL RESPONSES TO PHQ QUESTIONS 1-9: 0

## 2023-03-30 NOTE — ASSESSMENT & PLAN NOTE
Monitored by specialist- no acute findings meriting change in the plan   Dr. Gian Delgado propranolol as prescribed

## 2023-03-30 NOTE — ASSESSMENT & PLAN NOTE
Well-controlled, continue current medications   Protonix refilled  Avoid gut irritants such as spicy foods  Avoid laying down for 30 to 45 minutes after eating  Avoid excessive alcohol consumption

## 2023-03-30 NOTE — ASSESSMENT & PLAN NOTE
She was seen at [facility], test were completed. Unfortunately she has not had a response for her test results. Strongly encourage patient to follow-up with [facility] to get the results.

## 2023-03-30 NOTE — PROGRESS NOTES
Nandini Bernabe presents today for   Chief Complaint   Patient presents with    Follow-up                 1. \"Have you been to the ER, urgent care clinic since your last visit? Hospitalized since your last visit? \" no    2. \"Have you seen or consulted any other health care providers outside of the [address] [place] [address] since your last visit? \" no     3. For patients aged 39-70: Has the patient had a colonoscopy / FIT/ Cologuard? NA - based on age      If the patient is female:    4. For patients aged 41-77: Has the patient had a mammogram within the past 2 years? NA - based on age or sex      11. For patients aged 21-65: Has the patient had a pap smear?  Yes - no Care Gap present
medication and completed medication reconciliation with the patient. Reviewed side effects of medications with the patient. Questions were answered and patient verb understanding. Past Surgical History:   Procedure Laterality Date    COLONOSCOPY      UPPER GASTROINTESTINAL ENDOSCOPY       Allergies and Intolerances: Allergies   Allergen Reactions    Armodafinil      Other reaction(s): Unknown (comments)  Involuntary muscle movements    Solriamfetol      Other reaction(s): Unable to Obtain  hyperactive    Adhesive Tape Rash     Family History:   Family History   Problem Relation Age of Onset    Migraines Father     Osteoarthritis Mother     Attention Deficit Disorder Mother     Heart Attack Maternal Grandfather     High Cholesterol Maternal Grandfather     Lung Disease Maternal Grandfather     Eczema Maternal Grandmother     Attention Deficit Disorder Sister     Breast Cancer Maternal Aunt     Depression Maternal Grandmother     Lung Disease Maternal Grandmother     High Cholesterol Maternal Grandmother     Heart Attack Maternal Grandmother     Asthma Maternal Grandmother      Social History:   She  reports that she has never smoked.  She has never used smokeless tobacco.   Social History     Substance and Sexual Activity   Alcohol Use Never     Immunization History:  Immunization History   Administered Date(s) Administered    COVID-19, PFIZER GRAY top, DO NOT Dilute, (age 15 y+), IM, 30 mcg/0.3 mL 12/02/2021    COVID-19, PFIZER PURPLE top, DILUTE for use, (age 15 y+), 30mcg/0.3mL 04/25/2021, 05/16/2021    DTaP, INFANRIX, (age 6w-6y), IM, 0.5mL 2001, 01/28/2002, 03/27/2002, 05/14/2003, 09/29/2005    Hep A, HAVRIX, VAQTA, (age 16m-22y), IM, 0.5mL 12/28/2018    Hepatitis B vaccine 2001, 2001, 07/05/2002    Hib PRP-OMP, PEDVAXHIB, (age 1m-10y, Adlt Risk), IM, 0.5mL 2001, 01/28/2002, 03/27/2002, 10/30/2002    Meningococcal ACWY, MENACTRA (MenACWY-D), (age 7m-55y), IM, 0.5mL 02/28/2014,

## 2023-05-23 ENCOUNTER — TELEPHONE (OUTPATIENT)
Age: 23
End: 2023-05-23

## 2023-05-23 DIAGNOSIS — G47.411 NARCOLEPSY WITH CATAPLEXY: Primary | ICD-10-CM

## 2023-05-23 NOTE — TELEPHONE ENCOUNTER
She sees Dr. Carlos Manuel Ashraf group currently. Any other recommendations for neurologist that will prescribed this type of medication?

## 2023-05-23 NOTE — TELEPHONE ENCOUNTER
Patient stating she has been calling her neurologist for 2 weeks trying to get a refill on the [place] and they will not return her call. She wants to know if Dr. Anca Carpenter can refill it for her. Stating she takes 4 grams twice a night. Also wants to know if Dr. Anca Carpenter thinks she should see someone else for this since she is having such a hard time getting in touch with them.

## 2023-05-23 NOTE — TELEPHONE ENCOUNTER
I am unsure as to who else she could see. She may be able to get in with [facility] neurology but this may take months for appointment. I would recommend that she call her insurance and inquire as to who she can see through her insurance. She can call those offices and obtain an appointment date. She needs to call this office with the provider's name, phone number, address so we can place a new referral for her.   Thank you

## 2023-05-23 NOTE — TELEPHONE ENCOUNTER
Charlaine Dance, Unfortunately she will have to reach back out to her neuro for refills of this medication. If she wishes to change neurologists, place a referral to Dr Stormy Box group.  Thank you

## 2023-05-24 NOTE — TELEPHONE ENCOUNTER
Referral signed     Diagnosis Orders   1.  Narcolepsy with cataplexy  External Referral To Sleep Medicine

## 2023-08-23 ENCOUNTER — OFFICE VISIT (OUTPATIENT)
Age: 23
End: 2023-08-23
Payer: COMMERCIAL

## 2023-08-23 VITALS
HEART RATE: 75 BPM | BODY MASS INDEX: 41.75 KG/M2 | WEIGHT: 266 LBS | OXYGEN SATURATION: 100 % | SYSTOLIC BLOOD PRESSURE: 128 MMHG | DIASTOLIC BLOOD PRESSURE: 78 MMHG | HEIGHT: 67 IN

## 2023-08-23 DIAGNOSIS — G90.A POSTURAL ORTHOSTATIC TACHYCARDIA SYNDROME (POTS): Primary | ICD-10-CM

## 2023-08-23 DIAGNOSIS — R55 SYNCOPE AND COLLAPSE: ICD-10-CM

## 2023-08-23 PROCEDURE — 93000 ELECTROCARDIOGRAM COMPLETE: CPT | Performed by: INTERNAL MEDICINE

## 2023-08-23 PROCEDURE — 99215 OFFICE O/P EST HI 40 MIN: CPT | Performed by: INTERNAL MEDICINE

## 2023-08-23 ASSESSMENT — PATIENT HEALTH QUESTIONNAIRE - PHQ9
7. TROUBLE CONCENTRATING ON THINGS, SUCH AS READING THE NEWSPAPER OR WATCHING TELEVISION: 0
SUM OF ALL RESPONSES TO PHQ QUESTIONS 1-9: 0
8. MOVING OR SPEAKING SO SLOWLY THAT OTHER PEOPLE COULD HAVE NOTICED. OR THE OPPOSITE, BEING SO FIGETY OR RESTLESS THAT YOU HAVE BEEN MOVING AROUND A LOT MORE THAN USUAL: 0
SUM OF ALL RESPONSES TO PHQ QUESTIONS 1-9: 0
3. TROUBLE FALLING OR STAYING ASLEEP: 0
6. FEELING BAD ABOUT YOURSELF - OR THAT YOU ARE A FAILURE OR HAVE LET YOURSELF OR YOUR FAMILY DOWN: 0
1. LITTLE INTEREST OR PLEASURE IN DOING THINGS: 0
4. FEELING TIRED OR HAVING LITTLE ENERGY: 0
SUM OF ALL RESPONSES TO PHQ QUESTIONS 1-9: 0
10. IF YOU CHECKED OFF ANY PROBLEMS, HOW DIFFICULT HAVE THESE PROBLEMS MADE IT FOR YOU TO DO YOUR WORK, TAKE CARE OF THINGS AT HOME, OR GET ALONG WITH OTHER PEOPLE: 0
9. THOUGHTS THAT YOU WOULD BE BETTER OFF DEAD, OR OF HURTING YOURSELF: 0
SUM OF ALL RESPONSES TO PHQ9 QUESTIONS 1 & 2: 0
SUM OF ALL RESPONSES TO PHQ QUESTIONS 1-9: 0
5. POOR APPETITE OR OVEREATING: 0
2. FEELING DOWN, DEPRESSED OR HOPELESS: 0

## 2023-08-23 NOTE — PROGRESS NOTES
[facility] presents today for   Chief Complaint   Patient presents with    Follow-up     6 month f/u with orthostatics     Chest Pain     Left/Right and center chest sharp,stabbing,pressure,tightness and shooting pains on//off    Palpitations     Pounding palps     Shortness of Breath     SOB with exertion and at rest     Dizziness     Severe dizzy spells        [facility] preferred language for health care discussion is english/other. Is someone accompanying this pt? yes    Is the patient using any DME equipment during OV? no    Depression Screening:  Depression: Not at risk    PHQ-2 Score: 0        Learning Assessment:  Who is the primary learner? Patient    What is the preferred language for health care of the primary learner? ENGLISH    How does the primary learner prefer to learn new concepts? DEMONSTRATION    Answered By patient    Relationship to Learner SELF           Pt currently taking Anticoagulant therapy? no    Pt currently taking Antiplatelet therapy ? no      Coordination of Care:  1. Have you been to the ER, urgent care clinic since your last visit? Hospitalized since your last visit? no    2. Have you seen or consulted any other health care providers outside of the [address] [place] [address] since your last visit? Include any pap smears or colon screening.  no

## 2023-08-23 NOTE — PROGRESS NOTES
[facility]    Syncope, CP, palps, tachycardia    HPI    [address] Luis is a 25 y.o.  female here for several complaints. Progressively over last couple of years she has become almost nonfunctional. She has had 4 episodes of syncope, last episode ~2-3 months ago, witnessed at the GI dr office while seated with LOC. She no longer works or drives and comes with her mother to appts. She has chronic abdominal pain (being worked up for porphyria), narcolepsy/ cataplexy (mgt by Neuro). She's not had CV testing. When I first met her, she appeared drowsy. HR was 160s from sitting to standing/ walking and came down to 130s at rest seated, EKG sinus tach 130s. She needed assistance, could barely walk back to the room and just touching her/ putting leads on her chest she is writhing in pain and clutching her chest.    Today she looks like a completely different person. Very upbeat, talkative, interactive. HR is normal, overall feeling much better and feels it was Adderall she was taking that made her worse even to the point of extreme anger/ altering her mood. Feels propanolol 10 mg a day helps her palpitations but still having a lot of dizziness despite negative orthostatics. She just got engaged and is relocating out of state in the new year. Past Medical History:   Diagnosis Date    Acid reflux     Anxiety     Depression     Narcolepsy     PUD (peptic ulcer disease)     Spina bifida occulta     Syncope        Past Surgical History:   Procedure Laterality Date    HX COLONOSCOPY      HX ENDOSCOPY         Current Outpatient Medications   Medication Sig Dispense Refill    multivit with calcium,iron,min (WOMEN'S DAILY MULTIVITAMIN PO) Take  by mouth daily. cyanocobalamin 1,000 mcg tablet Take 1,000 mcg by mouth daily. ascorbic acid, vitamin C, (Vitamin C) 500 mg tablet Take 500 mg by mouth daily. magnesium oxide 500 mg tab Take  by mouth every other day.       citalopram (CELEXA) 40 mg

## 2023-09-07 DIAGNOSIS — F41.8 ANXIETY WITH DEPRESSION: ICD-10-CM

## 2023-09-07 RX ORDER — CITALOPRAM 40 MG/1
40 TABLET ORAL DAILY
Qty: 93 TABLET | Refills: 3 | OUTPATIENT
Start: 2023-09-07

## 2023-09-07 NOTE — TELEPHONE ENCOUNTER
----- Message from Trever Teran, [id number] sent at 9/7/2023 11:35 AM EDT -----  Subject: Refill Request    QUESTIONS  Name of Medication? citalopram (CELEXA) 40 MG tablet  Patient-reported dosage and instructions? 40mg   How many days do you have left? 5  Preferred Pharmacy? [id number]  Pharmacy phone number (if available)? [phone]  ---------------------------------------------------------------------------  --------------  CALL BACK INFO  What is the best way for the office to contact you? OK to leave message on   voicemail  Preferred Call Back Phone Number? [phone]  ---------------------------------------------------------------------------  --------------  SCRIPT ANSWERS  Relationship to Patient?  Self

## 2023-09-12 ENCOUNTER — TELEPHONE (OUTPATIENT)
Age: 23
End: 2023-09-12

## 2023-09-12 NOTE — TELEPHONE ENCOUNTER
----- Message from Jose Cruz Toure, [address] sent at 9/7/2023 11:34 AM EDT -----  Subject: Hospital Follow Up    QUESTIONS  What hospital was the Patient Discharged from? [facility]  Date of Discharge? 2023-07-19  Discharge Location? Home  Reason for hospitalization as patient stated? Admitted for a high fever,   body aches, and nausea  What question does the patient have, if applicable?   ---------------------------------------------------------------------------  --------------  CALL BACK INFO  What is the best way for the office to contact you? OK to leave message on   voicemail  Preferred Call Back Phone Number? [phone]  ---------------------------------------------------------------------------  --------------  SCRIPT ANSWERS  Relationship to Patient?  Self

## 2023-09-14 RX ORDER — PROPRANOLOL HYDROCHLORIDE 10 MG/1
10 TABLET ORAL DAILY
Qty: 90 TABLET | Refills: 3 | Status: SHIPPED | OUTPATIENT
Start: 2023-09-14

## 2023-09-14 NOTE — TELEPHONE ENCOUNTER
Patient states that the corlanor is not helping and would like to go back on inderal     Verbal order and read back per [id number] to change

## 2023-09-20 RX ORDER — PROPRANOLOL HYDROCHLORIDE 20 MG/1
20 TABLET ORAL 3 TIMES DAILY
Qty: 90 TABLET | Refills: 6 | Status: SHIPPED | OUTPATIENT
Start: 2023-09-20

## 2024-01-25 NOTE — ASSESSMENT & PLAN NOTE
Well-controlled, continue current medications   Continue Celexa 40 mg daily  Refill given Show Applicator Variable?: Yes Duration Of Freeze Thaw-Cycle (Seconds): 5 Application Tool (Optional): Liquid Nitrogen Sprayer Post-Care Instructions: I reviewed with the patient in detail post-care instructions. Patient is to wear sunprotection, and avoid picking at any of the treated lesions. Pt may apply Vaseline to crusted or scabbing areas. Number Of Freeze-Thaw Cycles: 1 freeze-thaw cycle Render Note In Bullet Format When Appropriate: No Detail Level: Detailed Consent: The patient's consent was obtained including but not limited to risks of crusting, scabbing, blistering, scarring, darker or lighter pigmentary change, recurrence, incomplete removal and infection.

## 2024-02-12 DIAGNOSIS — F41.8 ANXIETY WITH DEPRESSION: ICD-10-CM

## 2024-02-12 RX ORDER — CITALOPRAM 40 MG/1
40 TABLET ORAL DAILY
Qty: 31 TABLET | Refills: 5 | OUTPATIENT
Start: 2024-02-12

## 2024-02-18 DIAGNOSIS — F41.8 ANXIETY WITH DEPRESSION: ICD-10-CM

## 2024-02-19 RX ORDER — CITALOPRAM 40 MG/1
40 TABLET ORAL DAILY
Qty: 31 TABLET | Refills: 5 | OUTPATIENT
Start: 2024-02-19

## (undated) DEVICE — BITE BLOCK ENDOSCP UNIV AD 6 TO 9.4 MM

## (undated) DEVICE — MEDI-VAC NON-CONDUCTIVE SUCTION TUBING: Brand: CARDINAL HEALTH

## (undated) DEVICE — FCPS RAD JAW 4LC 240CM W/NDL -- BX/20 RADIAL JAW 4

## (undated) DEVICE — MEDI-VAC SUCTION HIGH CAPACITY: Brand: CARDINAL HEALTH

## (undated) DEVICE — SYR 50ML SLIP TIP NSAF LF STRL --

## (undated) DEVICE — STERILE POLYISOPRENE POWDER-FREE SURGICAL GLOVES: Brand: PROTEXIS

## (undated) DEVICE — GAUZE,SPONGE,4"X4",16PLY,STRL,LF,10/TRAY: Brand: MEDLINE

## (undated) DEVICE — FLUFF AND POLYMER UNDERPAD,EXTRA HEAVY: Brand: WINGS

## (undated) DEVICE — FLEX ADVANTAGE 3000CC: Brand: FLEX ADVANTAGE

## (undated) DEVICE — SYRINGE MED 25GA 3ML L5/8IN SUBQ PLAS W/ DETACH NDL SFTY

## (undated) DEVICE — ENDOSCOPY PUMP TUBING/ CAP SET: Brand: ERBE

## (undated) DEVICE — SOLUTION IRRIG 1000ML H2O STRL BLT

## (undated) DEVICE — CATHETER SUCT TR FL TIP 14FR W/ O CTRL

## (undated) DEVICE — BASIN EMESIS 500CC ROSE 250/CS 60/PLT: Brand: MEDEGEN MEDICAL PRODUCTS, LLC

## (undated) DEVICE — AIRLIFE™ NASAL OXYGEN CANNULA CURVED, FLARED TIP WITH 14 FOOT (4.3 M) CRUSH-RESISTANT TUBING, OVER-THE-EAR STYLE: Brand: AIRLIFE™